# Patient Record
Sex: MALE | Race: WHITE | Employment: FULL TIME | URBAN - METROPOLITAN AREA
[De-identification: names, ages, dates, MRNs, and addresses within clinical notes are randomized per-mention and may not be internally consistent; named-entity substitution may affect disease eponyms.]

---

## 2017-01-04 RX ORDER — SULFASALAZINE 500 MG/1
TABLET ORAL
Qty: 120 TABLET | Refills: 0 | Status: SHIPPED | OUTPATIENT
Start: 2017-01-04 | End: 2017-03-17

## 2017-03-17 ENCOUNTER — OFFICE VISIT (OUTPATIENT)
Dept: RHEUMATOLOGY | Age: 67
End: 2017-03-17

## 2017-03-17 VITALS
TEMPERATURE: 98.6 F | SYSTOLIC BLOOD PRESSURE: 134 MMHG | HEIGHT: 66 IN | DIASTOLIC BLOOD PRESSURE: 70 MMHG | WEIGHT: 166 LBS | BODY MASS INDEX: 26.68 KG/M2

## 2017-03-17 DIAGNOSIS — Z79.899 HIGH RISK MEDICATION USE: ICD-10-CM

## 2017-03-17 DIAGNOSIS — L40.9 PSORIASIS: ICD-10-CM

## 2017-03-17 DIAGNOSIS — L40.50 PSORIATIC ARTHRITIS (HCC): Primary | ICD-10-CM

## 2017-03-17 PROCEDURE — 99214 OFFICE O/P EST MOD 30 MIN: CPT | Performed by: INTERNAL MEDICINE

## 2017-03-17 RX ORDER — SULFASALAZINE 500 MG/1
TABLET ORAL
Qty: 180 TABLET | Refills: 2 | Status: SHIPPED | OUTPATIENT
Start: 2017-03-17 | End: 2017-09-11

## 2017-03-30 RX ORDER — CLOBETASOL PROPIONATE 0.5 MG/G
CREAM TOPICAL
Qty: 45 G | Refills: 0 | Status: SHIPPED | OUTPATIENT
Start: 2017-03-30 | End: 2017-05-26 | Stop reason: SDUPTHER

## 2017-05-26 RX ORDER — CLOBETASOL PROPIONATE 0.5 MG/G
CREAM TOPICAL
Qty: 45 G | Refills: 0 | Status: SHIPPED | OUTPATIENT
Start: 2017-05-26 | End: 2017-07-17 | Stop reason: SDUPTHER

## 2017-06-23 ENCOUNTER — OFFICE VISIT (OUTPATIENT)
Dept: RHEUMATOLOGY | Age: 67
End: 2017-06-23

## 2017-06-23 VITALS
WEIGHT: 158 LBS | DIASTOLIC BLOOD PRESSURE: 82 MMHG | TEMPERATURE: 98 F | HEIGHT: 66 IN | BODY MASS INDEX: 25.39 KG/M2 | SYSTOLIC BLOOD PRESSURE: 120 MMHG

## 2017-06-23 DIAGNOSIS — L40.9 PSORIASIS: ICD-10-CM

## 2017-06-23 DIAGNOSIS — Z79.899 HIGH RISK MEDICATION USE: ICD-10-CM

## 2017-06-23 DIAGNOSIS — L40.50 PSORIATIC ARTHRITIS (HCC): Primary | ICD-10-CM

## 2017-06-23 PROCEDURE — 99214 OFFICE O/P EST MOD 30 MIN: CPT | Performed by: INTERNAL MEDICINE

## 2017-07-17 RX ORDER — CLOBETASOL PROPIONATE 0.5 MG/G
CREAM TOPICAL
Qty: 45 G | Refills: 0 | Status: SHIPPED | OUTPATIENT
Start: 2017-07-17 | End: 2017-11-21

## 2017-09-11 ENCOUNTER — OFFICE VISIT (OUTPATIENT)
Dept: DERMATOLOGY | Age: 67
End: 2017-09-11

## 2017-09-11 DIAGNOSIS — L40.0 PLAQUE PSORIASIS: Primary | ICD-10-CM

## 2017-09-11 DIAGNOSIS — L40.50 PSORIATIC ARTHRITIS (HCC): ICD-10-CM

## 2017-09-11 PROCEDURE — 99213 OFFICE O/P EST LOW 20 MIN: CPT | Performed by: DERMATOLOGY

## 2017-09-11 RX ORDER — FLUOCINONIDE 1 MG/G
CREAM TOPICAL
Qty: 120 G | Refills: 3 | Status: SHIPPED | OUTPATIENT
Start: 2017-09-11 | End: 2017-11-21

## 2017-11-21 ENCOUNTER — OFFICE VISIT (OUTPATIENT)
Dept: DERMATOLOGY | Age: 67
End: 2017-11-21

## 2017-11-21 ENCOUNTER — HOSPITAL ENCOUNTER (OUTPATIENT)
Dept: OTHER | Age: 67
Discharge: HOME | End: 2017-11-21
Attending: INTERNAL MEDICINE | Admitting: INTERNAL MEDICINE

## 2017-11-21 ENCOUNTER — OFFICE VISIT (OUTPATIENT)
Dept: RHEUMATOLOGY | Age: 67
End: 2017-11-21

## 2017-11-21 VITALS
HEIGHT: 67 IN | DIASTOLIC BLOOD PRESSURE: 82 MMHG | BODY MASS INDEX: 25.58 KG/M2 | HEART RATE: 80 BPM | TEMPERATURE: 98.6 F | SYSTOLIC BLOOD PRESSURE: 138 MMHG | WEIGHT: 163 LBS

## 2017-11-21 DIAGNOSIS — L40.50 PSORIATIC ARTHRITIS (HCC): Primary | ICD-10-CM

## 2017-11-21 DIAGNOSIS — Z79.899 HIGH RISK MEDICATION USE: ICD-10-CM

## 2017-11-21 DIAGNOSIS — L40.0 PLAQUE PSORIASIS: Primary | ICD-10-CM

## 2017-11-21 DIAGNOSIS — L40.9 PSORIASIS: ICD-10-CM

## 2017-11-21 PROCEDURE — 99213 OFFICE O/P EST LOW 20 MIN: CPT | Performed by: DERMATOLOGY

## 2017-11-21 PROCEDURE — 99214 OFFICE O/P EST MOD 30 MIN: CPT | Performed by: INTERNAL MEDICINE

## 2017-11-21 RX ORDER — CLOBETASOL PROPIONATE 0.5 MG/G
OINTMENT TOPICAL
Qty: 60 G | Refills: 2 | Status: SHIPPED | OUTPATIENT
Start: 2017-11-21 | End: 2018-01-08 | Stop reason: SDUPTHER

## 2017-11-21 NOTE — PROGRESS NOTES
[address] Caesar Araujo MD                                                 [address], [address]                                                (G) [phone] (F)      Primary provider: Joleen Telles DO  Patient identification: Sergio Burroughs,: 1950,Sex: male           ASSESSMENT:  Hernando Duarte was seen today for follow-up. Diagnoses and all orders for this visit:    Psoriatic arthritis ([address]    Psoriasis    High risk medication use  -     XR HAND RIGHT (MIN 3 VIEWS); Future  -     XR HAND LEFT (MIN 3 VIEWS); Future      Started Methotrexate 2 weeks ago. Subjective improvement in his joint symptoms, objectively-has active disease especially across MCP joints. Skin psoriasis-followed by Dr. Shanae Gomez reviewed-approximate 6% body surface area involvement-clobetasol ointment, awaiting clinical response on methotrexate. Previous medication-  Sulfasalazine-numbness and swelling around lips. Jolane Pontiff PLAN:    As above. Blood work in 4 weeks  Follow-up in 3 months. Patient indicates understanding and agrees with the management plan. I reviewed patients medical information and medical history in the medical records. Note is transcribed using voice recognition software. Inadvertent computerized transcription errors may be present. ##################################################################    Subjective:  Returns for follow up  for psoriasis and psoriatic arthritis. Methotrexate was prescribed in 2017, he just started a couple of weeks ago. Patient was waiting for hernia surgery, which is done in 2017, recovered well. Some subjective improvement in terms of stiffness across MCP joints, swelling is persistent. Finger joints, wrist are affected. Skin psoriasis is about the same. Other joints are asymptomatic.   He is tolerating

## 2017-11-21 NOTE — PROGRESS NOTES
examined and determined to be normal: Psych/Neuro, Scalp/hair, Neck and Breast/axilla/chest.    The following were examined and determined to be abnormal: Head/face, Neck, Abdomen, RUE, LUE, RLE, LLE, Nails/digits and Genitalia/groin/buttocks. Well appearing. 1.  Back, buttocks > elbows, legs, abdomen and right cheek - discrete and coalescing scaly erythematous papules and plaques. Assessment and Plan     1. Plaque psoriasis - about 6% BSA    Clobetasol ointment twice daily to affected areas until improved. Should see improvement with methotrexate over the coming weeks. Return in about 6 months (around 5/21/2018).

## 2017-12-26 RX ORDER — FOLIC ACID 1 MG/1
TABLET ORAL
Qty: 90 TABLET | Refills: 0 | Status: SHIPPED | OUTPATIENT
Start: 2017-12-26

## 2018-01-08 RX ORDER — CLOBETASOL PROPIONATE 0.5 MG/G
OINTMENT TOPICAL
Qty: 60 G | Refills: 2 | Status: SHIPPED | OUTPATIENT
Start: 2018-01-08 | End: 2018-04-04 | Stop reason: SDUPTHER

## 2018-01-11 ENCOUNTER — HOSPITAL ENCOUNTER (OUTPATIENT)
Dept: OTHER | Age: 68
Discharge: HOME | End: 2018-01-11
Attending: INTERNAL MEDICINE | Admitting: INTERNAL MEDICINE

## 2018-01-11 DIAGNOSIS — Z79.899 HIGH RISK MEDICATION USE: ICD-10-CM

## 2018-01-11 LAB
ALBUMIN SERPL-MCNC: 4.3 G/DL (ref 3.4–5)
ALP BLD-CCNC: 126 U/L (ref 40–129)
ALT SERPL-CCNC: 21 U/L (ref 10–40)
AST SERPL-CCNC: 23 U/L (ref 15–37)
BASOPHILS ABSOLUTE: 0 K/UL (ref 0–0.2)
BASOPHILS RELATIVE PERCENT: 0.6 %
BILIRUB SERPL-MCNC: 1.3 MG/DL (ref 0–1)
BILIRUBIN DIRECT: <0.2 MG/DL (ref 0–0.3)
BILIRUBIN, INDIRECT: ABNORMAL MG/DL (ref 0–1)
C-REACTIVE PROTEIN: 3.4 MG/L (ref 0–5.1)
CREAT SERPL-MCNC: 0.9 MG/DL (ref 0.8–1.3)
EOSINOPHILS ABSOLUTE: 0.3 K/UL (ref 0–0.6)
EOSINOPHILS RELATIVE PERCENT: 6.2 %
GFR AFRICAN AMERICAN: >60
GFR NON-AFRICAN AMERICAN: >60
HCT VFR BLD CALC: 43.8 % (ref 40.5–52.5)
HEMOGLOBIN: 14.7 G/DL (ref 13.5–17.5)
LYMPHOCYTES ABSOLUTE: 0.8 K/UL (ref 1–5.1)
LYMPHOCYTES RELATIVE PERCENT: 17.7 %
MCH RBC QN AUTO: 31.9 PG (ref 26–34)
MCHC RBC AUTO-ENTMCNC: 33.6 G/DL (ref 31–36)
MCV RBC AUTO: 95.1 FL (ref 80–100)
MONOCYTES ABSOLUTE: 0.4 K/UL (ref 0–1.3)
MONOCYTES RELATIVE PERCENT: 9 %
NEUTROPHILS ABSOLUTE: 3.1 K/UL (ref 1.7–7.7)
NEUTROPHILS RELATIVE PERCENT: 66.5 %
PDW BLD-RTO: 14.6 % (ref 12.4–15.4)
PLATELET # BLD: 167 K/UL (ref 135–450)
PMV BLD AUTO: 8.8 FL (ref 5–10.5)
RBC # BLD: 4.61 M/UL (ref 4.2–5.9)
SEDIMENTATION RATE, ERYTHROCYTE: 13 MM/HR (ref 0–20)
TOTAL PROTEIN: 7.2 G/DL (ref 6.4–8.2)
WBC # BLD: 4.6 K/UL (ref 4–11)

## 2018-04-04 RX ORDER — CLOBETASOL PROPIONATE 0.5 MG/G
OINTMENT TOPICAL
Qty: 60 G | Refills: 2 | Status: SHIPPED | OUTPATIENT
Start: 2018-04-04 | End: 2018-09-20 | Stop reason: SDUPTHER

## 2018-04-14 ENCOUNTER — HOSPITAL ENCOUNTER (OUTPATIENT)
Dept: OTHER | Age: 68
Discharge: HOME | End: 2018-04-14
Attending: INTERNAL MEDICINE | Admitting: INTERNAL MEDICINE

## 2018-04-14 LAB
A/G RATIO: 1.6 (ref 1.1–2.2)
ALBUMIN SERPL-MCNC: 4.2 G/DL (ref 3.4–5)
ALP BLD-CCNC: 152 U/L (ref 40–129)
ALT SERPL-CCNC: 18 U/L (ref 10–40)
ANION GAP SERPL CALCULATED.3IONS-SCNC: 17 MMOL/L (ref 3–16)
AST SERPL-CCNC: 20 U/L (ref 15–37)
BASOPHILS ABSOLUTE: 0.1 K/UL (ref 0–0.2)
BASOPHILS RELATIVE PERCENT: 1 %
BILIRUB SERPL-MCNC: 0.3 MG/DL (ref 0–1)
BILIRUBIN DIRECT: <0.2 MG/DL (ref 0–0.3)
BILIRUBIN, INDIRECT: NORMAL MG/DL (ref 0–1)
BUN BLDV-MCNC: 14 MG/DL (ref 7–20)
C-REACTIVE PROTEIN: 1.6 MG/L (ref 0–5.1)
CALCIUM SERPL-MCNC: 8.7 MG/DL (ref 8.3–10.6)
CHLORIDE BLD-SCNC: 102 MMOL/L (ref 99–110)
CO2: 23 MMOL/L (ref 21–32)
CREAT SERPL-MCNC: 0.7 MG/DL (ref 0.8–1.3)
EOSINOPHILS ABSOLUTE: 0.3 K/UL (ref 0–0.6)
EOSINOPHILS RELATIVE PERCENT: 6.5 %
GFR AFRICAN AMERICAN: >60
GFR NON-AFRICAN AMERICAN: >60
GLOBULIN: 2.7 G/DL
GLUCOSE BLD-MCNC: 90 MG/DL (ref 70–99)
HCT VFR BLD CALC: 41.5 % (ref 40.5–52.5)
HEMOGLOBIN: 14.2 G/DL (ref 13.5–17.5)
LYMPHOCYTES ABSOLUTE: 0.9 K/UL (ref 1–5.1)
LYMPHOCYTES RELATIVE PERCENT: 17.7 %
MCH RBC QN AUTO: 33.2 PG (ref 26–34)
MCHC RBC AUTO-ENTMCNC: 34.2 G/DL (ref 31–36)
MCV RBC AUTO: 97 FL (ref 80–100)
MONOCYTES ABSOLUTE: 0.5 K/UL (ref 0–1.3)
MONOCYTES RELATIVE PERCENT: 9.7 %
NEUTROPHILS ABSOLUTE: 3.3 K/UL (ref 1.7–7.7)
NEUTROPHILS RELATIVE PERCENT: 65.1 %
PDW BLD-RTO: 13.7 % (ref 12.4–15.4)
PLATELET # BLD: 163 K/UL (ref 135–450)
PMV BLD AUTO: 9.4 FL (ref 5–10.5)
POTASSIUM SERPL-SCNC: 4.2 MMOL/L (ref 3.5–5.1)
RBC # BLD: 4.28 M/UL (ref 4.2–5.9)
SEDIMENTATION RATE, ERYTHROCYTE: 12 MM/HR (ref 0–20)
SODIUM BLD-SCNC: 142 MMOL/L (ref 136–145)
TOTAL PROTEIN: 6.9 G/DL (ref 6.4–8.2)
WBC # BLD: 5.1 K/UL (ref 4–11)

## 2018-05-14 ENCOUNTER — OFFICE VISIT (OUTPATIENT)
Dept: DERMATOLOGY | Age: 68
End: 2018-05-14

## 2018-05-14 DIAGNOSIS — L40.0 PLAQUE PSORIASIS: Primary | ICD-10-CM

## 2018-05-14 PROCEDURE — 99213 OFFICE O/P EST LOW 20 MIN: CPT | Performed by: DERMATOLOGY

## 2018-06-17 ENCOUNTER — HOSPITAL ENCOUNTER (OUTPATIENT)
Dept: OTHER | Age: 68
Discharge: HOME | End: 2018-06-17
Attending: INTERNAL MEDICINE | Admitting: INTERNAL MEDICINE

## 2018-06-17 LAB
ALBUMIN SERPL-MCNC: 4.4 G/DL (ref 3.4–5)
ALP BLD-CCNC: 145 U/L (ref 40–129)
ALT SERPL-CCNC: 21 U/L (ref 10–40)
AST SERPL-CCNC: 25 U/L (ref 15–37)
BASOPHILS ABSOLUTE: 0 K/UL (ref 0–0.2)
BASOPHILS RELATIVE PERCENT: 0.9 %
BILIRUB SERPL-MCNC: 0.5 MG/DL (ref 0–1)
BILIRUBIN DIRECT: <0.2 MG/DL (ref 0–0.3)
BILIRUBIN, INDIRECT: ABNORMAL MG/DL (ref 0–1)
EOSINOPHILS ABSOLUTE: 0.3 K/UL (ref 0–0.6)
EOSINOPHILS RELATIVE PERCENT: 5.9 %
HCT VFR BLD CALC: 42.2 % (ref 40.5–52.5)
HEMOGLOBIN: 14.6 G/DL (ref 13.5–17.5)
LYMPHOCYTES ABSOLUTE: 0.7 K/UL (ref 1–5.1)
LYMPHOCYTES RELATIVE PERCENT: 14.6 %
MCH RBC QN AUTO: 34.3 PG (ref 26–34)
MCHC RBC AUTO-ENTMCNC: 34.6 G/DL (ref 31–36)
MCV RBC AUTO: 99.1 FL (ref 80–100)
MONOCYTES ABSOLUTE: 0.5 K/UL (ref 0–1.3)
MONOCYTES RELATIVE PERCENT: 9.8 %
NEUTROPHILS ABSOLUTE: 3.2 K/UL (ref 1.7–7.7)
NEUTROPHILS RELATIVE PERCENT: 68.8 %
PDW BLD-RTO: 14.2 % (ref 12.4–15.4)
PLATELET # BLD: 158 K/UL (ref 135–450)
PMV BLD AUTO: 9.8 FL (ref 5–10.5)
RBC # BLD: 4.26 M/UL (ref 4.2–5.9)
SEDIMENTATION RATE, ERYTHROCYTE: 8 MM/HR (ref 0–20)
TOTAL PROTEIN: 6.9 G/DL (ref 6.4–8.2)
WBC # BLD: 4.6 K/UL (ref 4–11)

## 2018-06-21 ENCOUNTER — OFFICE VISIT (OUTPATIENT)
Dept: RHEUMATOLOGY | Age: 68
End: 2018-06-21

## 2018-06-21 VITALS
WEIGHT: 161 LBS | TEMPERATURE: 98.2 F | SYSTOLIC BLOOD PRESSURE: 124 MMHG | BODY MASS INDEX: 25.27 KG/M2 | HEIGHT: 67 IN | HEART RATE: 70 BPM | DIASTOLIC BLOOD PRESSURE: 72 MMHG

## 2018-06-21 DIAGNOSIS — L40.50 PSORIATIC ARTHRITIS (HCC): Primary | ICD-10-CM

## 2018-06-21 DIAGNOSIS — L40.9 PSORIASIS: ICD-10-CM

## 2018-06-21 DIAGNOSIS — Z79.899 HIGH RISK MEDICATION USE: ICD-10-CM

## 2018-06-21 PROCEDURE — 99214 OFFICE O/P EST MOD 30 MIN: CPT | Performed by: INTERNAL MEDICINE

## 2018-06-25 ENCOUNTER — PATIENT MESSAGE (OUTPATIENT)
Dept: RHEUMATOLOGY | Age: 68
End: 2018-06-25

## 2018-06-25 DIAGNOSIS — M19.90 INFLAMMATORY ARTHRITIS: Primary | ICD-10-CM

## 2018-08-22 ENCOUNTER — HOSPITAL ENCOUNTER (OUTPATIENT)
Age: 68
Discharge: HOME | End: 2018-08-22
Payer: COMMERCIAL

## 2018-08-22 DIAGNOSIS — Z79.899 HIGH RISK MEDICATION USE: ICD-10-CM

## 2018-08-22 LAB
A/G RATIO: 1.6 (ref 1.1–2.2)
ALBUMIN SERPL-MCNC: 4.4 G/DL (ref 3.4–5)
ALP BLD-CCNC: 133 U/L (ref 40–129)
ALT SERPL-CCNC: 23 U/L (ref 10–40)
ANION GAP SERPL CALCULATED.3IONS-SCNC: 13 MMOL/L (ref 3–16)
AST SERPL-CCNC: 23 U/L (ref 15–37)
BASOPHILS ABSOLUTE: 0.1 K/UL (ref 0–0.2)
BASOPHILS RELATIVE PERCENT: 1.3 %
BILIRUB SERPL-MCNC: 0.7 MG/DL (ref 0–1)
BUN BLDV-MCNC: 11 MG/DL (ref 7–20)
CALCIUM SERPL-MCNC: 9 MG/DL (ref 8.3–10.6)
CHLORIDE BLD-SCNC: 102 MMOL/L (ref 99–110)
CO2: 26 MMOL/L (ref 21–32)
CREAT SERPL-MCNC: 0.7 MG/DL (ref 0.8–1.3)
EOSINOPHILS ABSOLUTE: 0.3 K/UL (ref 0–0.6)
EOSINOPHILS RELATIVE PERCENT: 6.6 %
GFR AFRICAN AMERICAN: >60
GFR NON-AFRICAN AMERICAN: >60
GLOBULIN: 2.7 G/DL
GLUCOSE BLD-MCNC: 94 MG/DL (ref 70–99)
HCT VFR BLD CALC: 44.1 % (ref 40.5–52.5)
HEMOGLOBIN: 15 G/DL (ref 13.5–17.5)
LYMPHOCYTES ABSOLUTE: 0.7 K/UL (ref 1–5.1)
LYMPHOCYTES RELATIVE PERCENT: 16.3 %
MCH RBC QN AUTO: 34 PG (ref 26–34)
MCHC RBC AUTO-ENTMCNC: 34 G/DL (ref 31–36)
MCV RBC AUTO: 99.8 FL (ref 80–100)
MONOCYTES ABSOLUTE: 0.4 K/UL (ref 0–1.3)
MONOCYTES RELATIVE PERCENT: 9.8 %
NEUTROPHILS ABSOLUTE: 2.7 K/UL (ref 1.7–7.7)
NEUTROPHILS RELATIVE PERCENT: 66 %
PDW BLD-RTO: 13.6 % (ref 12.4–15.4)
PLATELET # BLD: 165 K/UL (ref 135–450)
PMV BLD AUTO: 9.3 FL (ref 5–10.5)
POTASSIUM SERPL-SCNC: 4.6 MMOL/L (ref 3.5–5.1)
RBC # BLD: 4.42 M/UL (ref 4.2–5.9)
SODIUM BLD-SCNC: 141 MMOL/L (ref 136–145)
TOTAL PROTEIN: 7.1 G/DL (ref 6.4–8.2)
WBC # BLD: 4.1 K/UL (ref 4–11)

## 2018-08-22 PROCEDURE — 85025 COMPLETE CBC W/AUTO DIFF WBC: CPT

## 2018-08-22 PROCEDURE — 36415 COLL VENOUS BLD VENIPUNCTURE: CPT

## 2018-08-22 PROCEDURE — 80053 COMPREHEN METABOLIC PANEL: CPT

## 2018-08-27 ENCOUNTER — PATIENT MESSAGE (OUTPATIENT)
Dept: RHEUMATOLOGY | Age: 68
End: 2018-08-27

## 2018-08-27 DIAGNOSIS — M19.90 INFLAMMATORY ARTHRITIS: ICD-10-CM

## 2018-09-18 ENCOUNTER — OFFICE VISIT (OUTPATIENT)
Dept: RHEUMATOLOGY | Age: 68
End: 2018-09-18

## 2018-09-18 VITALS
DIASTOLIC BLOOD PRESSURE: 78 MMHG | WEIGHT: 164 LBS | HEART RATE: 74 BPM | TEMPERATURE: 98.2 F | BODY MASS INDEX: 25.74 KG/M2 | SYSTOLIC BLOOD PRESSURE: 122 MMHG | HEIGHT: 67 IN

## 2018-09-18 DIAGNOSIS — L40.9 PSORIASIS: ICD-10-CM

## 2018-09-18 DIAGNOSIS — L40.50 PSORIATIC ARTHRITIS (HCC): Primary | ICD-10-CM

## 2018-09-18 DIAGNOSIS — Z79.899 HIGH RISK MEDICATION USE: ICD-10-CM

## 2018-09-18 PROCEDURE — 99214 OFFICE O/P EST MOD 30 MIN: CPT | Performed by: INTERNAL MEDICINE

## 2018-09-18 NOTE — PROGRESS NOTES
systems are negative. Past medical, surgical, family history- reviewed. Current Outpatient Prescriptions   Medication Sig Dispense Refill    methotrexate (RHEUMATREX) 2.5 MG chemo tablet Take 8 tab po once a week 32 tablet 1    clobetasol (TEMOVATE) 0.05 % ointment Apply to affected areas twice daily for up to 2 weeks or until improved. 60 g 2    folic acid (FOLVITE) 1 MG tablet TAKE 1 TABLET BY MOUTH DAILY 90 tablet 0    doxazosin (CARDURA) 4 MG tablet TAKE 1 TABLET BY MOUTH DAILY 90 tablet 1    Multiple Vitamins-Minerals (MULTIVITAMIN PO) Take  by mouth.  aspirin EC 81 MG EC tablet Take 81 mg by mouth daily.  Cholecalciferol (VITAMIN D3) 2000 UNITS CAPS Take  by mouth. No current facility-administered medications for this visit. Allergies   Allergen Reactions    Sulfasalazine Swelling     Swelling and numbness around lips.  Lisinopril Swelling     No arbs also           OBJECTIVE  Physical    Vitals:    09/18/18 0739   BP: 122/78   Pulse: 74   Temp: 98.2 °F (36.8 °C)       General appearance: alert, appears stated age and cooperative. MKS:   28 joint JOINT COUNT:                               Right                   Left   Swell Tender Swell Tender   PIP1           PIP2           PIP3           PIP4          PIP5          MCP1           MCP2 Bony swelling       MCP3       MCP4        MCP5           Wrist           Elbow           Shoulder          Knee             Bony swelling of MCPS R hand 2,3, non tender, subjective stiffness. FROM in all peripheral joints, including fists. Normal gait and muscle strength in upper and lower extremities. Skin: no Psoriasis. No induration. No nail changes. HEENT: Normal lids and pupils. No oral or nasal ulcers. Salivary glands reveals no evidence of abnormality.        Data:  Lab Results   Component Value Date    WBC 4.1 08/22/2018    RBC 4.42 08/22/2018    HGB 15.0 08/22/2018    HCT 44.1 08/22/2018     08/22/2018    MCV 99.8 08/22/2018    MCH 34.0 08/22/2018    MCHC 34.0 08/22/2018    RDW 13.6 08/22/2018    SEGSPCT 67 06/21/2014    LYMPHOPCT 16.3 08/22/2018    MONOPCT 9.8 08/22/2018    BASOPCT 1.3 08/22/2018    MONOSABS 0.4 08/22/2018    LYMPHSABS 0.7 08/22/2018    EOSABS 0.3 08/22/2018    BASOSABS 0.1 08/22/2018       Chemistry        Component Value Date/Time     08/22/2018 0715    K 4.6 08/22/2018 0715     08/22/2018 0715    CO2 26 08/22/2018 0715    BUN 11 08/22/2018 0715    CREATININE 0.7 (L) 08/22/2018 0715        Component Value Date/Time    CALCIUM 9.0 08/22/2018 0715    ALKPHOS 133 (H) 08/22/2018 0715    AST 23 08/22/2018 0715    ALT 23 08/22/2018 0715    BILITOT 0.7 08/22/2018 0715          Lab Results   Component Value Date    SEDRATE 8 06/17/2018              I thank you for giving me the opportunity to be involved in [employer] care and I look forward following Susy Adler along with you. If you have any questions or concerns please feel free to contact me at any time.     Dasia Rod MD 09/18/18

## 2018-09-20 RX ORDER — CLOBETASOL PROPIONATE 0.5 MG/G
OINTMENT TOPICAL
Qty: 60 G | Refills: 2 | Status: SHIPPED | OUTPATIENT
Start: 2018-09-20 | End: 2018-12-26 | Stop reason: SDUPTHER

## 2018-09-20 NOTE — TELEPHONE ENCOUNTER
From: Romi Jensen  Sent: 9/20/2018 2:55 PM EDT  Subject: Medication Renewal Request    Romi Jensen would like a refill of the following medications:     clobetasol (TEMOVATE) 0.05 % ointment Socorro Nelson MD]    Preferred pharmacy: [facility] [address] - P [phone] - F [phone]

## 2018-10-26 DIAGNOSIS — M19.90 INFLAMMATORY ARTHRITIS: ICD-10-CM

## 2018-10-27 ENCOUNTER — HOSPITAL ENCOUNTER (OUTPATIENT)
Age: 68
Discharge: HOME | End: 2018-10-27
Payer: COMMERCIAL

## 2018-10-27 DIAGNOSIS — Z79.899 HIGH RISK MEDICATION USE: ICD-10-CM

## 2018-10-27 LAB
ALT SERPL-CCNC: 28 U/L (ref 10–40)
AST SERPL-CCNC: 27 U/L (ref 15–37)
BASOPHILS ABSOLUTE: 0 K/UL (ref 0–0.2)
BASOPHILS RELATIVE PERCENT: 0.7 %
CREAT SERPL-MCNC: 0.8 MG/DL (ref 0.8–1.3)
EOSINOPHILS ABSOLUTE: 0.3 K/UL (ref 0–0.6)
EOSINOPHILS RELATIVE PERCENT: 6.2 %
GFR AFRICAN AMERICAN: >60
GFR NON-AFRICAN AMERICAN: >60
HCT VFR BLD CALC: 43.5 % (ref 40.5–52.5)
HEMOGLOBIN: 14.6 G/DL (ref 13.5–17.5)
LYMPHOCYTES ABSOLUTE: 0.8 K/UL (ref 1–5.1)
LYMPHOCYTES RELATIVE PERCENT: 16.5 %
MCH RBC QN AUTO: 33.2 PG (ref 26–34)
MCHC RBC AUTO-ENTMCNC: 33.6 G/DL (ref 31–36)
MCV RBC AUTO: 99 FL (ref 80–100)
MONOCYTES ABSOLUTE: 0.5 K/UL (ref 0–1.3)
MONOCYTES RELATIVE PERCENT: 10.2 %
NEUTROPHILS ABSOLUTE: 3.2 K/UL (ref 1.7–7.7)
NEUTROPHILS RELATIVE PERCENT: 66.4 %
PDW BLD-RTO: 13.5 % (ref 12.4–15.4)
PLATELET # BLD: 168 K/UL (ref 135–450)
PMV BLD AUTO: 9.3 FL (ref 5–10.5)
RBC # BLD: 4.39 M/UL (ref 4.2–5.9)
WBC # BLD: 4.8 K/UL (ref 4–11)

## 2018-10-27 PROCEDURE — 84450 TRANSFERASE (AST) (SGOT): CPT

## 2018-10-27 PROCEDURE — 85025 COMPLETE CBC W/AUTO DIFF WBC: CPT

## 2018-10-27 PROCEDURE — 36415 COLL VENOUS BLD VENIPUNCTURE: CPT

## 2018-10-27 PROCEDURE — 84460 ALANINE AMINO (ALT) (SGPT): CPT

## 2018-10-27 PROCEDURE — 82565 ASSAY OF CREATININE: CPT

## 2018-11-24 DIAGNOSIS — M19.90 INFLAMMATORY ARTHRITIS: ICD-10-CM

## 2018-12-18 ENCOUNTER — OFFICE VISIT (OUTPATIENT)
Dept: RHEUMATOLOGY | Age: 68
End: 2018-12-18
Payer: COMMERCIAL

## 2018-12-18 VITALS
HEIGHT: 67 IN | HEART RATE: 74 BPM | DIASTOLIC BLOOD PRESSURE: 76 MMHG | TEMPERATURE: 98.3 F | SYSTOLIC BLOOD PRESSURE: 124 MMHG | BODY MASS INDEX: 25.9 KG/M2 | WEIGHT: 165 LBS

## 2018-12-18 DIAGNOSIS — M15.9 PRIMARY OSTEOARTHRITIS INVOLVING MULTIPLE JOINTS: ICD-10-CM

## 2018-12-18 DIAGNOSIS — Z79.899 HIGH RISK MEDICATION USE: ICD-10-CM

## 2018-12-18 DIAGNOSIS — L40.9 PSORIASIS: ICD-10-CM

## 2018-12-18 DIAGNOSIS — L40.50 PSORIATIC ARTHRITIS (HCC): Primary | ICD-10-CM

## 2018-12-18 PROCEDURE — 99214 OFFICE O/P EST MOD 30 MIN: CPT | Performed by: INTERNAL MEDICINE

## 2018-12-18 NOTE — PROGRESS NOTES
[address] Caesar Araujo MD                                                 [address], [address]                                               [phone] (V) [phone] (F)      Primary provider: Sam Ho DO  Patient identification: Dann Burroughs,: 1950,Sex: male           ASSESSMENT:  Lila Swan was seen today for follow-up. Diagnoses and all orders for this visit:    Psoriatic arthritis ([facility] [address]    Psoriasis    High risk medication use    Primary osteoarthritis involving multiple joints         Psoriasis and psoriatic arthritis- in remission on Methotrexate 20 mg weekly. OA hands- getting worse with weather change, is able to manage without any meds. Labs stable, [facility]. Skin psoriasis-followed by Dr. José Kelly reviewed-approximate 6% body surface area involvement- now cleared. Completed all vaccinations. Previous medication-  Sulfasalazine-numbness and swelling around lips. Jolaine Dent PLAN:  Continue above medications. Hand exercises discussed for osteoarthritis. Labs in early March, follow-up in 3 months. Patient indicates understanding and agrees with the management plan. I reviewed patients medical information and medical history in the medical records. Note is transcribed using voice recognition software. Inadvertent computerized transcription errors may be present. ##################################################################    Subjective:  Returns for follow up  for psoriasis, psoriatic arthritis and osteoarthritis. He is doing well in terms of psoriasis and psoriatic arthritis, in remission. No skin lesions, painful or swollen joints. He is noticing more achiness and stiffness mainly across DIPs, PIPs, CMC, able to manage with stressing. Tolerating medications well.   ADLs and recreational activities are normal. Denies any cough, shortness of breath, GI upset, mucositis, rashes. All other review of systems are negative. Past medical, surgical, family history- reviewed. Current Outpatient Prescriptions   Medication Sig Dispense Refill    methotrexate (RHEUMATREX) 2.5 MG chemo tablet TAKE 8 TABLETS BY MOUTH ONCE PER WEEK AS DIRECTED 32 tablet 0    clobetasol (TEMOVATE) 0.05 % ointment Apply to affected areas twice daily for up to 2 weeks or until improved. 60 g 2    folic acid (FOLVITE) 1 MG tablet TAKE 1 TABLET BY MOUTH DAILY 90 tablet 0    doxazosin (CARDURA) 4 MG tablet TAKE 1 TABLET BY MOUTH DAILY 90 tablet 1    Multiple Vitamins-Minerals (MULTIVITAMIN PO) Take  by mouth.  aspirin EC 81 MG EC tablet Take 81 mg by mouth daily.  Cholecalciferol (VITAMIN D3) 2000 UNITS CAPS Take  by mouth. No current facility-administered medications for this visit. Allergies   Allergen Reactions    Sulfasalazine Swelling     Swelling and numbness around lips.  Lisinopril Swelling     No arbs also           OBJECTIVE  Physical    Vitals:    12/18/18 0745   BP: 124/76   Pulse: 74   Temp: 98.3 °F (36.8 °C)       General appearance: alert, appears stated age and cooperative. MKS:   28 joint JOINT COUNT:                               Right                   Left   Swell Tender Swell Tender   PIP1           PIP2           PIP3           PIP4          PIP5          MCP1           MCP2        MCP3       MCP4        MCP5           Wrist           Elbow           Shoulder          Knee             I do not appreciate any tender, swollen or inflamed joints in upper or lower extremities. Bony swelling across MCPs, PIPs, DIPs is unchanged from prior visits. FROM in all peripheral joints, including fists. Not able to extend hand fully especially right hand fingers. Normal gait and muscle strength in upper and lower extremities. Skin: no Psoriasis. No induration. No nail changes.   HEENT: Normal lids and pupils. No oral or nasal ulcers. Salivary glands reveals no evidence of abnormality. Data:  Lab Results   Component Value Date    WBC 4.8 10/27/2018    RBC 4.39 10/27/2018    HGB 14.6 10/27/2018    HCT 43.5 10/27/2018     10/27/2018    MCV 99.0 10/27/2018    MCH 33.2 10/27/2018    MCHC 33.6 10/27/2018    RDW 13.5 10/27/2018    SEGSPCT 67 06/21/2014    LYMPHOPCT 16.5 10/27/2018    MONOPCT 10.2 10/27/2018    BASOPCT 0.7 10/27/2018    MONOSABS 0.5 10/27/2018    LYMPHSABS 0.8 10/27/2018    EOSABS 0.3 10/27/2018    BASOSABS 0.0 10/27/2018       Chemistry        Component Value Date/Time     08/22/2018 0715    K 4.6 08/22/2018 0715     08/22/2018 0715    CO2 26 08/22/2018 0715    BUN 11 08/22/2018 0715    CREATININE 0.8 10/27/2018 1030        Component Value Date/Time    CALCIUM 9.0 08/22/2018 0715    ALKPHOS 133 (H) 08/22/2018 0715    AST 27 10/27/2018 1030    ALT 28 10/27/2018 1030    BILITOT 0.7 08/22/2018 0715          Lab Results   Component Value Date    SEDRATE 8 06/17/2018              I thank you for giving me the opportunity to be involved in [employer] care and I look forward following Shanda More along with you. If you have any questions or concerns please feel free to contact me at any time.     Roberto Borrego MD 12/18/18

## 2018-12-26 RX ORDER — CLOBETASOL PROPIONATE 0.5 MG/G
OINTMENT TOPICAL
Qty: 60 G | Refills: 2 | Status: SHIPPED | OUTPATIENT
Start: 2018-12-26 | End: 2019-03-25 | Stop reason: SDUPTHER

## 2019-01-02 DIAGNOSIS — M19.90 INFLAMMATORY ARTHRITIS: ICD-10-CM

## 2019-01-03 DIAGNOSIS — M19.90 INFLAMMATORY ARTHRITIS: ICD-10-CM

## 2019-02-23 ENCOUNTER — HOSPITAL ENCOUNTER (OUTPATIENT)
Age: 69
Discharge: HOME | End: 2019-02-23
Payer: COMMERCIAL

## 2019-02-23 DIAGNOSIS — Z79.899 HIGH RISK MEDICATION USE: ICD-10-CM

## 2019-02-23 LAB
ALT SERPL-CCNC: 19 U/L (ref 10–40)
AST SERPL-CCNC: 21 U/L (ref 15–37)
BASOPHILS ABSOLUTE: 0 K/UL (ref 0–0.2)
BASOPHILS RELATIVE PERCENT: 0.6 %
CREAT SERPL-MCNC: 0.8 MG/DL (ref 0.8–1.3)
EOSINOPHILS ABSOLUTE: 0.3 K/UL (ref 0–0.6)
EOSINOPHILS RELATIVE PERCENT: 6.5 %
GFR AFRICAN AMERICAN: >60
GFR NON-AFRICAN AMERICAN: >60
HCT VFR BLD CALC: 43.7 % (ref 40.5–52.5)
HEMOGLOBIN: 14.6 G/DL (ref 13.5–17.5)
LYMPHOCYTES ABSOLUTE: 0.7 K/UL (ref 1–5.1)
LYMPHOCYTES RELATIVE PERCENT: 15 %
MCH RBC QN AUTO: 33.3 PG (ref 26–34)
MCHC RBC AUTO-ENTMCNC: 33.4 G/DL (ref 31–36)
MCV RBC AUTO: 99.5 FL (ref 80–100)
MONOCYTES ABSOLUTE: 0.6 K/UL (ref 0–1.3)
MONOCYTES RELATIVE PERCENT: 12.3 %
NEUTROPHILS ABSOLUTE: 3.2 K/UL (ref 1.7–7.7)
NEUTROPHILS RELATIVE PERCENT: 65.6 %
PDW BLD-RTO: 13.4 % (ref 12.4–15.4)
PLATELET # BLD: 180 K/UL (ref 135–450)
PMV BLD AUTO: 9.6 FL (ref 5–10.5)
RBC # BLD: 4.39 M/UL (ref 4.2–5.9)
WBC # BLD: 4.8 K/UL (ref 4–11)

## 2019-02-23 PROCEDURE — 36415 COLL VENOUS BLD VENIPUNCTURE: CPT

## 2019-02-23 PROCEDURE — 84460 ALANINE AMINO (ALT) (SGPT): CPT

## 2019-02-23 PROCEDURE — 84450 TRANSFERASE (AST) (SGOT): CPT

## 2019-02-23 PROCEDURE — 85025 COMPLETE CBC W/AUTO DIFF WBC: CPT

## 2019-02-23 PROCEDURE — 82565 ASSAY OF CREATININE: CPT

## 2019-02-26 DIAGNOSIS — M19.90 INFLAMMATORY ARTHRITIS: ICD-10-CM

## 2019-03-21 ENCOUNTER — OFFICE VISIT (OUTPATIENT)
Dept: RHEUMATOLOGY | Age: 69
End: 2019-03-21
Payer: COMMERCIAL

## 2019-03-21 VITALS
BODY MASS INDEX: 24.33 KG/M2 | TEMPERATURE: 97.8 F | HEIGHT: 67 IN | WEIGHT: 155 LBS | DIASTOLIC BLOOD PRESSURE: 82 MMHG | HEART RATE: 80 BPM | SYSTOLIC BLOOD PRESSURE: 124 MMHG

## 2019-03-21 DIAGNOSIS — Z79.899 HIGH RISK MEDICATION USE: ICD-10-CM

## 2019-03-21 DIAGNOSIS — L40.50 PSORIATIC ARTHRITIS (HCC): Primary | ICD-10-CM

## 2019-03-21 DIAGNOSIS — L40.9 PSORIASIS: ICD-10-CM

## 2019-03-21 PROCEDURE — 99214 OFFICE O/P EST MOD 30 MIN: CPT | Performed by: INTERNAL MEDICINE

## 2019-03-25 ENCOUNTER — PATIENT MESSAGE (OUTPATIENT)
Dept: DERMATOLOGY | Age: 69
End: 2019-03-25

## 2019-03-25 RX ORDER — CLOBETASOL PROPIONATE 0.5 MG/G
OINTMENT TOPICAL
Qty: 60 G | Refills: 2 | Status: SHIPPED | OUTPATIENT
Start: 2019-03-25 | End: 2019-07-29 | Stop reason: SDUPTHER

## 2019-03-26 ENCOUNTER — PATIENT MESSAGE (OUTPATIENT)
Dept: DERMATOLOGY | Age: 69
End: 2019-03-26

## 2019-05-20 ENCOUNTER — PATIENT MESSAGE (OUTPATIENT)
Dept: DERMATOLOGY | Age: 69
End: 2019-05-20

## 2019-05-20 ENCOUNTER — OFFICE VISIT (OUTPATIENT)
Dept: DERMATOLOGY | Age: 69
End: 2019-05-20
Payer: COMMERCIAL

## 2019-05-20 DIAGNOSIS — L40.0 PLAQUE PSORIASIS: Primary | ICD-10-CM

## 2019-05-20 DIAGNOSIS — L57.0 ACTINIC KERATOSIS: ICD-10-CM

## 2019-05-20 PROCEDURE — 99213 OFFICE O/P EST LOW 20 MIN: CPT | Performed by: DERMATOLOGY

## 2019-05-20 RX ORDER — CALCIPOTRIENE 50 UG/G
CREAM TOPICAL
Qty: 60 G | Refills: 4 | Status: SHIPPED | OUTPATIENT
Start: 2019-05-20 | End: 2020-02-27 | Stop reason: SDUPTHER

## 2019-05-20 RX ORDER — CALCIPOTRIENE 50 UG/G
CREAM TOPICAL
Qty: 60 G | Refills: 4 | Status: SHIPPED | OUTPATIENT
Start: 2019-05-20 | End: 2019-05-20 | Stop reason: SDUPTHER

## 2019-05-20 NOTE — PROGRESS NOTES
[facility]  Sakina Rudd MD  [phone]      Jia Burroughs  1950    71 y.o. male     Date of Visit: 5/20/2019    Chief Complaint: psoraisis    History of Present Illness:    1. He returns today to follow-up for moderately extensive plaque psoriasis in the setting of psoriatic arthritis. Overall doing great on methotrexate and with use of clobetasol cream. C/o some lesions on the back and legs. On methotrexate 20 mg weekly for psoriatic arthritis. Review of Systems:  Skin: no other lesions or growths. Past Medical History, Family History, Surgical History, Medications and Allergies reviewed. Past Medical History:   Diagnosis Date    History of SCC (squamous cell carcinoma) of skin 10/3/14    RT cheek    Hypertension      Past Surgical History:   Procedure Laterality Date    APPENDECTOMY  1996    COLONOSCOPY  2014    next 2017   [address] Left 09/2017    OTHER SURGICAL HISTORY Right     Squamous cell carcinoma of R cheek       Allergies   Allergen Reactions    Sulfasalazine Swelling     Swelling and numbness around lips.  Lisinopril Swelling     No arbs also       Outpatient Medications Marked as Taking for the 5/20/19 encounter (Office Visit) with Huseyin Gunter MD   Medication Sig Dispense Refill    calcipotriene (DOVONEX) 0.005 % cream Apply to affected areas on the back twice daily. 60 g 4    clobetasol (TEMOVATE) 0.05 % ointment Apply to affected areas twice daily for up to 2 weeks or until improved. 60 g 2    methotrexate (RHEUMATREX) 2.5 MG chemo tablet Take 8 tablets by mouth once a week 32 tablet 0    folic acid (FOLVITE) 1 MG tablet TAKE 1 TABLET BY MOUTH DAILY 90 tablet 0    doxazosin (CARDURA) 4 MG tablet TAKE 1 TABLET BY MOUTH DAILY 90 tablet 1    Multiple Vitamins-Minerals (MULTIVITAMIN PO) Take  by mouth.  aspirin EC 81 MG EC tablet Take 81 mg by mouth daily.       Cholecalciferol (VITAMIN D3) 2000

## 2019-05-20 NOTE — TELEPHONE ENCOUNTER
From: Shasta Varela  To: Moshe Beard MD  Sent: 5/20/2019 1:46 PM EDT  Subject: Prescription Question    Please change the prescription from [facility] to [facility] (corner of [employer] [address], [place], [address]). It will save me about $86 with Good RX coupon. Thank you!     Ni Dumont \"[facility] [facility]

## 2019-06-18 ENCOUNTER — HOSPITAL ENCOUNTER (OUTPATIENT)
Age: 69
Discharge: HOME | End: 2019-06-18
Payer: COMMERCIAL

## 2019-06-18 DIAGNOSIS — M19.90 INFLAMMATORY ARTHRITIS: ICD-10-CM

## 2019-06-18 DIAGNOSIS — Z79.899 HIGH RISK MEDICATION USE: ICD-10-CM

## 2019-06-18 LAB
ALT SERPL-CCNC: 23 U/L (ref 10–40)
AST SERPL-CCNC: 24 U/L (ref 15–37)
BASOPHILS ABSOLUTE: 0 K/UL (ref 0–0.2)
BASOPHILS RELATIVE PERCENT: 0.6 %
C-REACTIVE PROTEIN: 6.4 MG/L (ref 0–5.1)
CREAT SERPL-MCNC: 0.8 MG/DL (ref 0.8–1.3)
EOSINOPHILS ABSOLUTE: 0.2 K/UL (ref 0–0.6)
EOSINOPHILS RELATIVE PERCENT: 4.7 %
GFR AFRICAN AMERICAN: >60
GFR NON-AFRICAN AMERICAN: >60
HCT VFR BLD CALC: 43.5 % (ref 40.5–52.5)
HEMOGLOBIN: 14.7 G/DL (ref 13.5–17.5)
LYMPHOCYTES ABSOLUTE: 0.6 K/UL (ref 1–5.1)
LYMPHOCYTES RELATIVE PERCENT: 13.5 %
MCH RBC QN AUTO: 33.6 PG (ref 26–34)
MCHC RBC AUTO-ENTMCNC: 33.8 G/DL (ref 31–36)
MCV RBC AUTO: 99.4 FL (ref 80–100)
MONOCYTES ABSOLUTE: 0.5 K/UL (ref 0–1.3)
MONOCYTES RELATIVE PERCENT: 11 %
NEUTROPHILS ABSOLUTE: 3.3 K/UL (ref 1.7–7.7)
NEUTROPHILS RELATIVE PERCENT: 70.2 %
PDW BLD-RTO: 13.6 % (ref 12.4–15.4)
PLATELET # BLD: 158 K/UL (ref 135–450)
PMV BLD AUTO: 9 FL (ref 5–10.5)
RBC # BLD: 4.37 M/UL (ref 4.2–5.9)
WBC # BLD: 4.7 K/UL (ref 4–11)

## 2019-06-18 PROCEDURE — 82565 ASSAY OF CREATININE: CPT

## 2019-06-18 PROCEDURE — 36415 COLL VENOUS BLD VENIPUNCTURE: CPT

## 2019-06-18 PROCEDURE — 86140 C-REACTIVE PROTEIN: CPT

## 2019-06-18 PROCEDURE — 84460 ALANINE AMINO (ALT) (SGPT): CPT

## 2019-06-18 PROCEDURE — 84450 TRANSFERASE (AST) (SGOT): CPT

## 2019-06-18 PROCEDURE — 85025 COMPLETE CBC W/AUTO DIFF WBC: CPT

## 2019-06-24 ENCOUNTER — OFFICE VISIT (OUTPATIENT)
Dept: RHEUMATOLOGY | Age: 69
End: 2019-06-24
Payer: COMMERCIAL

## 2019-06-24 VITALS
HEIGHT: 67 IN | BODY MASS INDEX: 25.43 KG/M2 | WEIGHT: 162 LBS | DIASTOLIC BLOOD PRESSURE: 74 MMHG | SYSTOLIC BLOOD PRESSURE: 132 MMHG

## 2019-06-24 DIAGNOSIS — Z79.899 HIGH RISK MEDICATION USE: ICD-10-CM

## 2019-06-24 DIAGNOSIS — L40.9 PSORIASIS: ICD-10-CM

## 2019-06-24 DIAGNOSIS — M15.9 PRIMARY OSTEOARTHRITIS INVOLVING MULTIPLE JOINTS: ICD-10-CM

## 2019-06-24 DIAGNOSIS — L40.50 PSORIATIC ARTHRITIS (HCC): Primary | ICD-10-CM

## 2019-06-24 PROCEDURE — 99214 OFFICE O/P EST MOD 30 MIN: CPT | Performed by: INTERNAL MEDICINE

## 2019-06-24 NOTE — PROGRESS NOTES
[address] Caesar Araujo MD                                                 [address], [address] [facility]                                                (M) [phone] (F)      Primary provider: Flaco Santos DO  Patient identification: Timbo Burroughs,: 1950,Sex: male       ASSESSMENT:  Miguel Angel Ruffin was seen today for follow-up. Diagnoses and all orders for this visit:    Psoriatic arthritis ([facility] [address]    High risk medication use    Psoriasis    Primary osteoarthritis involving multiple joints    Other orders  -     methotrexate (RHEUMATREX) 2.5 MG chemo tablet; Take 8 tab po once a week         Psoriasis and psoriatic arthritis-asymptomatic on methotrexate 20 mg weekly. Tolerating medications well. Intercurrent discomfort in his fingers, knees from osteoarthritis, intermittent mild symptoms not needing any additional medications. Labs are stable to continue current medications. Skin psoriasis-followed by Dr. Sorin Back reviewed-approximate 6% body surface area involvement-no lesions now. Previous medication-  Sulfasalazine-numbness and swelling around lips. Silver Forester PLAN:  Stay on current medications. Call me with any flares. Labs and follow-up in 3 months. Patient indicates understanding and agrees with the management plan. I reviewed patients medical information and medical history in the medical records. Note is transcribed using voice recognition software. Inadvertent computerized transcription errors may be present. ##################################################################    Subjective:  Returns for follow up  for psoriasis, psoriatic arthritis and osteoarthritis.     Lala Gar is doing very well in terms of arthritis, states that he did a lot of work in his house including  patio without any problems other than mild knee discomfort. No a.m. stiffness, swelling any joints. No flares since last seen. Skin psoriasis is in remission. Tolerating medications well. ADLs and recreational activities are normal. Denies any cough, shortness of breath, GI upset, mucositis, rashes. All other review of systems are negative. Past medical, surgical, family history- reviewed. Current Outpatient Medications   Medication Sig Dispense Refill    methotrexate (RHEUMATREX) 2.5 MG chemo tablet Take 8 tab po once a week 96 tablet 0    methotrexate (RHEUMATREX) 2.5 MG chemo tablet Take 8 tablets by mouth once a week 32 tablet 0    calcipotriene (DOVONEX) 0.005 % cream Apply to affected areas on the back twice daily. 60 g 4    clobetasol (TEMOVATE) 0.05 % ointment Apply to affected areas twice daily for up to 2 weeks or until improved. 60 g 2    folic acid (FOLVITE) 1 MG tablet TAKE 1 TABLET BY MOUTH DAILY 90 tablet 0    doxazosin (CARDURA) 4 MG tablet TAKE 1 TABLET BY MOUTH DAILY 90 tablet 1    Multiple Vitamins-Minerals (MULTIVITAMIN PO) Take  by mouth.  aspirin EC 81 MG EC tablet Take 81 mg by mouth daily.  Cholecalciferol (VITAMIN D3) 2000 UNITS CAPS Take  by mouth. No current facility-administered medications for this visit. Allergies   Allergen Reactions    Sulfasalazine Swelling     Swelling and numbness around lips.  Lisinopril Swelling     No arbs also           OBJECTIVE  Physical    Vitals:    06/24/19 0738   BP: 132/74       General appearance: alert, appears stated age and cooperative. MKS:   28 joint JOINT COUNT:                               Right                   Left   Swell Tender Swell Tender   PIP1           PIP2           PIP3           PIP4          PIP5          MCP1           MCP2        MCP3       MCP4        MCP5           Wrist           Elbow           Shoulder          Knee             Other than asymptomatic osteoarthritic changes across finger joints MCPs, PIPs, DIPs and knees.   He does not have any tender, swollen or inflamed joints in upper or lower extremities, full range of motion in all peripheral joints including strong full fist bilaterally. Normal gait and muscle strength in upper and lower extremities. Skin: no Psoriasis. No induration. No nail changes. HEENT: Normal lids and pupils. No oral or nasal ulcers. Salivary glands reveals no evidence of abnormality. Data:  Lab Results   Component Value Date    WBC 4.7 06/18/2019    RBC 4.37 06/18/2019    HGB 14.7 06/18/2019    HCT 43.5 06/18/2019     06/18/2019    MCV 99.4 06/18/2019    MCH 33.6 06/18/2019    MCHC 33.8 06/18/2019    RDW 13.6 06/18/2019    SEGSPCT 67 06/21/2014    LYMPHOPCT 13.5 06/18/2019    MONOPCT 11.0 06/18/2019    BASOPCT 0.6 06/18/2019    MONOSABS 0.5 06/18/2019    LYMPHSABS 0.6 06/18/2019    EOSABS 0.2 06/18/2019    BASOSABS 0.0 06/18/2019       Chemistry        Component Value Date/Time     08/22/2018 0715    K 4.6 08/22/2018 0715     08/22/2018 0715    CO2 26 08/22/2018 0715    BUN 11 08/22/2018 0715    CREATININE 0.8 06/18/2019 0724        Component Value Date/Time    CALCIUM 9.0 08/22/2018 0715    ALKPHOS 133 (H) 08/22/2018 0715    AST 24 06/18/2019 0724    ALT 23 06/18/2019 0724    BILITOT 0.7 08/22/2018 0715          Lab Results   Component Value Date    SEDRATE 8 06/17/2018              I thank you for giving me the opportunity to be involved in [employer] care and I look forward following Aden Lemus along with you. If you have any questions or concerns please feel free to contact me at any time.     Purnima Quick MD 06/24/19

## 2019-07-29 RX ORDER — CLOBETASOL PROPIONATE 0.5 MG/G
OINTMENT TOPICAL
Qty: 60 G | Refills: 2 | Status: SHIPPED | OUTPATIENT
Start: 2019-07-29 | End: 2019-10-22 | Stop reason: SDUPTHER

## 2019-09-06 ENCOUNTER — HOSPITAL ENCOUNTER (OUTPATIENT)
Age: 69
Discharge: HOME | End: 2019-09-06
Payer: COMMERCIAL

## 2019-09-06 DIAGNOSIS — Z79.899 HIGH RISK MEDICATION USE: ICD-10-CM

## 2019-09-06 LAB
ALT SERPL-CCNC: 24 U/L (ref 10–40)
AST SERPL-CCNC: 27 U/L (ref 15–37)
BASOPHILS ABSOLUTE: 0 K/UL (ref 0–0.2)
BASOPHILS RELATIVE PERCENT: 0.8 %
C-REACTIVE PROTEIN: 2.7 MG/L (ref 0–5.1)
CREAT SERPL-MCNC: 0.8 MG/DL (ref 0.8–1.3)
EOSINOPHILS ABSOLUTE: 0.4 K/UL (ref 0–0.6)
EOSINOPHILS RELATIVE PERCENT: 8.9 %
GFR AFRICAN AMERICAN: >60
GFR NON-AFRICAN AMERICAN: >60
HCT VFR BLD CALC: 42.6 % (ref 40.5–52.5)
HEMOGLOBIN: 14.2 G/DL (ref 13.5–17.5)
LYMPHOCYTES ABSOLUTE: 0.6 K/UL (ref 1–5.1)
LYMPHOCYTES RELATIVE PERCENT: 14.2 %
MCH RBC QN AUTO: 33.3 PG (ref 26–34)
MCHC RBC AUTO-ENTMCNC: 33.4 G/DL (ref 31–36)
MCV RBC AUTO: 99.7 FL (ref 80–100)
MONOCYTES ABSOLUTE: 0.6 K/UL (ref 0–1.3)
MONOCYTES RELATIVE PERCENT: 14.1 %
NEUTROPHILS ABSOLUTE: 2.6 K/UL (ref 1.7–7.7)
NEUTROPHILS RELATIVE PERCENT: 62 %
PDW BLD-RTO: 13.9 % (ref 12.4–15.4)
PLATELET # BLD: 158 K/UL (ref 135–450)
PMV BLD AUTO: 9.6 FL (ref 5–10.5)
RBC # BLD: 4.27 M/UL (ref 4.2–5.9)
WBC # BLD: 4.1 K/UL (ref 4–11)

## 2019-09-06 PROCEDURE — 36415 COLL VENOUS BLD VENIPUNCTURE: CPT

## 2019-09-06 PROCEDURE — 85025 COMPLETE CBC W/AUTO DIFF WBC: CPT

## 2019-09-06 PROCEDURE — 84460 ALANINE AMINO (ALT) (SGPT): CPT

## 2019-09-06 PROCEDURE — 86140 C-REACTIVE PROTEIN: CPT

## 2019-09-06 PROCEDURE — 82565 ASSAY OF CREATININE: CPT

## 2019-09-06 PROCEDURE — 84450 TRANSFERASE (AST) (SGOT): CPT

## 2019-09-25 ENCOUNTER — OFFICE VISIT (OUTPATIENT)
Dept: RHEUMATOLOGY | Age: 69
End: 2019-09-25
Payer: COMMERCIAL

## 2019-09-25 VITALS
DIASTOLIC BLOOD PRESSURE: 76 MMHG | HEIGHT: 67 IN | SYSTOLIC BLOOD PRESSURE: 124 MMHG | BODY MASS INDEX: 25.27 KG/M2 | WEIGHT: 161 LBS

## 2019-09-25 DIAGNOSIS — Z79.899 HIGH RISK MEDICATION USE: ICD-10-CM

## 2019-09-25 DIAGNOSIS — L40.9 PSORIASIS: ICD-10-CM

## 2019-09-25 DIAGNOSIS — L40.50 PSORIATIC ARTHRITIS (HCC): Primary | ICD-10-CM

## 2019-09-25 PROCEDURE — 99214 OFFICE O/P EST MOD 30 MIN: CPT | Performed by: INTERNAL MEDICINE

## 2019-09-25 NOTE — PROGRESS NOTES
and lower extremities. Skin: no Psoriasis. No induration. No nail changes. HEENT: Normal lids and pupils. No oral or nasal ulcers. Salivary glands reveals no evidence of abnormality. Data:  Lab Results   Component Value Date    WBC 4.1 09/06/2019    RBC 4.27 09/06/2019    HGB 14.2 09/06/2019    HCT 42.6 09/06/2019     09/06/2019    MCV 99.7 09/06/2019    MCH 33.3 09/06/2019    MCHC 33.4 09/06/2019    RDW 13.9 09/06/2019    SEGSPCT 67 06/21/2014    LYMPHOPCT 14.2 09/06/2019    MONOPCT 14.1 09/06/2019    BASOPCT 0.8 09/06/2019    MONOSABS 0.6 09/06/2019    LYMPHSABS 0.6 09/06/2019    EOSABS 0.4 09/06/2019    BASOSABS 0.0 09/06/2019       Chemistry        Component Value Date/Time     08/22/2018 0715    K 4.6 08/22/2018 0715     08/22/2018 0715    CO2 26 08/22/2018 0715    BUN 11 08/22/2018 0715    CREATININE 0.8 09/06/2019 0708        Component Value Date/Time    CALCIUM 9.0 08/22/2018 0715    ALKPHOS 133 (H) 08/22/2018 0715    AST 27 09/06/2019 0708    ALT 24 09/06/2019 0708    BILITOT 0.7 08/22/2018 0715          Lab Results   Component Value Date    SEDRATE 8 06/17/2018              I thank you for giving me the opportunity to be involved in [facility] care and I look forward following Savannah Avila along with you. If you have any questions or concerns please feel free to contact me at any time.     Stephany Marcos MD 09/25/19

## 2019-10-22 RX ORDER — CLOBETASOL PROPIONATE 0.5 MG/G
OINTMENT TOPICAL
Qty: 60 G | Refills: 2 | Status: SHIPPED | OUTPATIENT
Start: 2019-10-22 | End: 2020-01-02 | Stop reason: SDUPTHER

## 2019-12-17 ENCOUNTER — OFFICE VISIT (OUTPATIENT)
Dept: RHEUMATOLOGY | Age: 69
End: 2019-12-17
Payer: COMMERCIAL

## 2019-12-17 VITALS
SYSTOLIC BLOOD PRESSURE: 124 MMHG | WEIGHT: 154 LBS | HEIGHT: 67 IN | DIASTOLIC BLOOD PRESSURE: 78 MMHG | BODY MASS INDEX: 24.17 KG/M2

## 2019-12-17 DIAGNOSIS — Z79.899 HIGH RISK MEDICATION USE: ICD-10-CM

## 2019-12-17 DIAGNOSIS — L40.9 PSORIASIS: ICD-10-CM

## 2019-12-17 DIAGNOSIS — L40.50 PSORIATIC ARTHRITIS (HCC): Primary | ICD-10-CM

## 2019-12-17 PROCEDURE — 99214 OFFICE O/P EST MOD 30 MIN: CPT | Performed by: INTERNAL MEDICINE

## 2020-01-02 RX ORDER — CLOBETASOL PROPIONATE 0.5 MG/G
OINTMENT TOPICAL
Qty: 60 G | Refills: 2 | Status: SHIPPED | OUTPATIENT
Start: 2020-01-02 | End: 2020-04-08 | Stop reason: SDUPTHER

## 2020-02-27 RX ORDER — CALCIPOTRIENE 50 UG/G
CREAM TOPICAL
Qty: 60 G | Refills: 4 | Status: SHIPPED | OUTPATIENT
Start: 2020-02-27 | End: 2021-05-04

## 2020-03-13 ENCOUNTER — HOSPITAL ENCOUNTER (OUTPATIENT)
Age: 70
Discharge: HOME | End: 2020-03-13
Payer: COMMERCIAL

## 2020-03-13 LAB
ALT SERPL-CCNC: 21 U/L (ref 10–40)
AST SERPL-CCNC: 23 U/L (ref 15–37)
BASOPHILS ABSOLUTE: 0 K/UL (ref 0–0.2)
BASOPHILS RELATIVE PERCENT: 0.9 %
C-REACTIVE PROTEIN: 2.1 MG/L (ref 0–5.1)
CREAT SERPL-MCNC: 0.7 MG/DL (ref 0.8–1.3)
EOSINOPHILS ABSOLUTE: 0.3 K/UL (ref 0–0.6)
EOSINOPHILS RELATIVE PERCENT: 6.8 %
GFR AFRICAN AMERICAN: >60
GFR NON-AFRICAN AMERICAN: >60
HCT VFR BLD CALC: 43.3 % (ref 40.5–52.5)
HEMOGLOBIN: 14.7 G/DL (ref 13.5–17.5)
LYMPHOCYTES ABSOLUTE: 0.7 K/UL (ref 1–5.1)
LYMPHOCYTES RELATIVE PERCENT: 17.9 %
MCH RBC QN AUTO: 33.7 PG (ref 26–34)
MCHC RBC AUTO-ENTMCNC: 33.9 G/DL (ref 31–36)
MCV RBC AUTO: 99.5 FL (ref 80–100)
MONOCYTES ABSOLUTE: 0.4 K/UL (ref 0–1.3)
MONOCYTES RELATIVE PERCENT: 9.5 %
NEUTROPHILS ABSOLUTE: 2.7 K/UL (ref 1.7–7.7)
NEUTROPHILS RELATIVE PERCENT: 64.9 %
PDW BLD-RTO: 13.3 % (ref 12.4–15.4)
PLATELET # BLD: 153 K/UL (ref 135–450)
PMV BLD AUTO: 9.2 FL (ref 5–10.5)
RBC # BLD: 4.36 M/UL (ref 4.2–5.9)
SEDIMENTATION RATE, ERYTHROCYTE: 9 MM/HR (ref 0–20)
WBC # BLD: 4.1 K/UL (ref 4–11)

## 2020-03-13 PROCEDURE — 84450 TRANSFERASE (AST) (SGOT): CPT

## 2020-03-13 PROCEDURE — 84460 ALANINE AMINO (ALT) (SGPT): CPT

## 2020-03-13 PROCEDURE — 85025 COMPLETE CBC W/AUTO DIFF WBC: CPT

## 2020-03-13 PROCEDURE — 85652 RBC SED RATE AUTOMATED: CPT

## 2020-03-13 PROCEDURE — 82565 ASSAY OF CREATININE: CPT

## 2020-03-13 PROCEDURE — 36415 COLL VENOUS BLD VENIPUNCTURE: CPT

## 2020-03-13 PROCEDURE — 86140 C-REACTIVE PROTEIN: CPT

## 2020-03-17 ENCOUNTER — OFFICE VISIT (OUTPATIENT)
Dept: RHEUMATOLOGY | Age: 70
End: 2020-03-17
Payer: COMMERCIAL

## 2020-03-17 VITALS
WEIGHT: 154 LBS | HEIGHT: 67 IN | BODY MASS INDEX: 24.17 KG/M2 | DIASTOLIC BLOOD PRESSURE: 82 MMHG | SYSTOLIC BLOOD PRESSURE: 126 MMHG

## 2020-03-17 PROCEDURE — 99214 OFFICE O/P EST MOD 30 MIN: CPT | Performed by: INTERNAL MEDICINE

## 2020-03-17 NOTE — PROGRESS NOTES
Apply to affected areas on the back twice daily. 60 g 4    clobetasol (TEMOVATE) 0.05 % ointment Apply to affected areas twice daily for up to 2 weeks or until improved. 60 g 2    methotrexate (RHEUMATREX) 2.5 MG chemo tablet Take 8 tablets by mouth once a week 32 tablet 0    folic acid (FOLVITE) 1 MG tablet TAKE 1 TABLET BY MOUTH DAILY 90 tablet 0    doxazosin (CARDURA) 4 MG tablet TAKE 1 TABLET BY MOUTH DAILY 90 tablet 1    Multiple Vitamins-Minerals (MULTIVITAMIN PO) Take  by mouth.  aspirin EC 81 MG EC tablet Take 81 mg by mouth daily.  Cholecalciferol (VITAMIN D3) 2000 UNITS CAPS Take  by mouth. No current facility-administered medications for this visit. Allergies   Allergen Reactions    Sulfasalazine Swelling     Swelling and numbness around lips.  Lisinopril Swelling     No arbs also           OBJECTIVE  Physical    Vitals:    03/17/20 0732   BP: 126/82       General appearance: alert, appears stated age and cooperative. MKS:   28 joint JOINT COUNT:                               Right                   Left   Swell Tender Swell Tender   PIP1           PIP2           PIP3           PIP4          PIP5          MCP1           MCP2        MCP3       MCP4        MCP5           Wrist           Elbow           Shoulder          Knee             Normal musculoskeletal examination other than osteoarthritic changes and mild dorsal subluxation of MCPs bilaterally. I do not appreciate any focally tender, swollen or inflamed joints in upper or lower extremities. Normal gait and muscle strength in upper and lower extremities. Skin: no Psoriasis. No induration. No nail changes. HEENT: Normal lids and pupils. No oral or nasal ulcers. Salivary glands reveals no evidence of abnormality.        Data:  Lab Results   Component Value Date    WBC 4.1 03/13/2020    RBC 4.36 03/13/2020    HGB 14.7 03/13/2020    HCT 43.3 03/13/2020     03/13/2020    MCV 99.5 03/13/2020    MCH 33.7

## 2020-04-08 RX ORDER — CLOBETASOL PROPIONATE 0.5 MG/G
OINTMENT TOPICAL
Qty: 60 G | Refills: 2 | Status: SHIPPED | OUTPATIENT
Start: 2020-04-08 | End: 2020-06-09 | Stop reason: SDUPTHER

## 2020-06-16 ENCOUNTER — VIRTUAL VISIT (OUTPATIENT)
Dept: RHEUMATOLOGY | Age: 70
End: 2020-06-16
Payer: COMMERCIAL

## 2020-06-16 PROCEDURE — 99214 OFFICE O/P EST MOD 30 MIN: CPT | Performed by: INTERNAL MEDICINE

## 2020-09-16 ENCOUNTER — PATIENT MESSAGE (OUTPATIENT)
Dept: DERMATOLOGY | Age: 70
End: 2020-09-16

## 2020-09-16 NOTE — TELEPHONE ENCOUNTER
LOV: 5/2019    Left message informing patient that he needs to see Dr Dolly Tinajero before any refills are sent.

## 2020-09-16 NOTE — TELEPHONE ENCOUNTER
From: Poly Young  To: Brennen Tavarez MD  Sent: 9/16/2020 1:06 PM EDT  Subject: Prescription Question    Good Afternoon,    I am following up on a prescription refill request I submitted on 9/10/20 for Clobetasol. Please advise.   Thanks,    Rob Suggs [facility] [facility]

## 2020-09-17 ENCOUNTER — PATIENT MESSAGE (OUTPATIENT)
Dept: DERMATOLOGY | Age: 70
End: 2020-09-17

## 2020-09-17 ENCOUNTER — TELEPHONE (OUTPATIENT)
Dept: DERMATOLOGY | Age: 70
End: 2020-09-17

## 2020-09-17 RX ORDER — CLOBETASOL PROPIONATE 0.5 MG/G
OINTMENT TOPICAL
Qty: 60 G | Refills: 2 | Status: SHIPPED | OUTPATIENT
Start: 2020-09-17 | End: 2020-09-18 | Stop reason: SDUPTHER

## 2020-09-17 NOTE — TELEPHONE ENCOUNTER
Patient scheduled for annual follow-up in February 2021 but is requesting a medication refill to hold him over until appointment. Please advise.

## 2020-09-17 NOTE — TELEPHONE ENCOUNTER
LOV: 5/2019  Upcoming appt: 2/2021    Patient asking for one refill to last him until his next appointment. Please advise.

## 2020-09-18 RX ORDER — CLOBETASOL PROPIONATE 0.5 MG/G
OINTMENT TOPICAL
Qty: 60 G | Refills: 2 | Status: SHIPPED | OUTPATIENT
Start: 2020-09-18 | End: 2020-12-04 | Stop reason: SDUPTHER

## 2020-09-18 NOTE — TELEPHONE ENCOUNTER
From: Momo Yeung  To: Xi Gonzalez MD  Sent: 9/17/2020 8:30 PM EDT  Subject: Prescription Question    There seems to be a communication problem with my prescription refill request. I scheduled an appointment with Dr Carolina Forman which will not be until February. However, I asked that my prescription be filled at [facility]. My insurance no longer accepts [facility] as a supplier. Please review my original re-fill request which requested [facility]. The difference in price is $168 at [place] and [facility] is $28.98. Please change the refill vendor.   Thanks,  Juan José Martel \" Samuel\" Nav[facility] Fernández

## 2020-09-21 ENCOUNTER — PATIENT MESSAGE (OUTPATIENT)
Dept: DERMATOLOGY | Age: 70
End: 2020-09-21

## 2020-09-21 NOTE — TELEPHONE ENCOUNTER
From: Ellen Johnson  To: Ara Price MD  Sent: 9/21/2020 8:41 AM EDT  Subject: Prescription Question    Thank you Karmenmariocarolina Stearns. Have a great day!      ----- Message -----   From:PHOEBE Thapa   PHQX:0/09/1261 8:17 AM EDT   To:Ayan Muñoz   Subject:RE: Prescription Question    Carleen Gaxiola,     Dr Thomas Serrano sent it to [facility] for you. Thank you,   Airam Stearns       ----- Message -----   From:Ayan Burroughs   Sent:9/17/2020 8:30 PM EDT   To:Thor Harvey MD   Subject:Prescription Question    There seems to be a communication problem with my prescription refill request. I scheduled an appointment with Dr Thomas Serrano which will not be until February. However, I asked that my prescription be filled at [facility]. My insurance no longer accepts [facility] as a supplier. Please review my original re-fill request which requested [facility]. The difference in price is $168 at [employer] and [facility] is $28.98. Please change the refill vendor.   Thanks,  Yaya Godinez \" Samuel\" Vane Smith

## 2020-09-22 ENCOUNTER — PATIENT MESSAGE (OUTPATIENT)
Dept: DERMATOLOGY | Age: 70
End: 2020-09-22

## 2020-09-22 NOTE — TELEPHONE ENCOUNTER
From: Zia Momin  To: Judy Cortez MD  Sent: 9/22/2020 3:59 PM EDT  Subject: Prescription Question    Thank you Jeffery Nunez. Have a great rest of your day.      ----- Message -----   From:SIERRAN Marguerite Halsted   GUBC:9/[birth year]/1819 3:35 PM EDT   To:Ayan Molina   Subject:RE: Prescription Question    Henok Baker,     [facility] and confirmed that the prescription for Clobetasol ointment was received. Thank you,   Jeffery Nunez       ----- Message -----   From:Ayan Molina   DSNY:1/03/0415 2:36 PM EDT   To:Thor Foss MD   Subject:Prescription Question    Yasmin Boeck,    I got your message on Monday that Dr. Hosea Zehng was correcting the prescription from [facility] to [facility] but [facility] has not received any prescription refill request. Can you check again please?   Thanks,  Jim Norris

## 2020-09-22 NOTE — TELEPHONE ENCOUNTER
From: Reena Menezes  To: Sheron Lala MD  Sent: 9/22/2020 2:36 PM EDT  Subject: Prescription Question    Andra San,    I got your message on Monday that Dr. Olan Olszewski was correcting the prescription from [facility] to [facility] but [facility] has not received any prescription refill request. Can you check again please?   Thanks,  Elio Ramos

## 2021-01-22 ENCOUNTER — OFFICE VISIT (OUTPATIENT)
Dept: RHEUMATOLOGY | Age: 71
End: 2021-01-22
Payer: COMMERCIAL

## 2021-01-22 VITALS — HEIGHT: 67 IN | TEMPERATURE: 97.5 F | BODY MASS INDEX: 24.17 KG/M2 | WEIGHT: 154 LBS

## 2021-01-22 DIAGNOSIS — L40.50 PSORIATIC ARTHRITIS (HCC): Primary | ICD-10-CM

## 2021-01-22 DIAGNOSIS — L40.9 PSORIASIS: ICD-10-CM

## 2021-01-22 DIAGNOSIS — Z79.899 HIGH RISK MEDICATION USE: ICD-10-CM

## 2021-01-22 PROCEDURE — 99214 OFFICE O/P EST MOD 30 MIN: CPT | Performed by: INTERNAL MEDICINE

## 2021-01-22 NOTE — PROGRESS NOTES
[address] Araujo MD                                                                                                [phone] (x) [phone] (F)      Primary provider: Kimmie Hooks DO  Patient identification: Aime Burroughs,: 1950,Sex: male       ASSESSMENT:  Karlo Shepherd was seen today for follow-up. Diagnoses and all orders for this visit:    Psoriatic arthritis ([address]    Psoriasis    High risk medication use    Other orders  -     methotrexate (RHEUMATREX) 2.5 MG chemo tablet; Take 8 Tabs po once a week, same day every week,  split dose 6-8 hours apart on the same day. Psoriasis-asymptomatic. Psoriatic arthritis flared in his left second and third finger with dactylitis when he did not take methotrexate for about a month due to COVID-19 exposure. Restarted methotrexate last week is helping with the symptoms. He also had multiple trigger fingers which were injected by hand surgeon, that also helped with arthritis flare. Doing much better now. Safety labs are normal from 2020-reviewed from 2020 [facility] normal CBCD and chemistry. Osteoarthritis generalized-finger joints, DJD spine-stable. Skin psoriasis-followed by Dr. Willian Dotson reviewed-approximate 6% body surface area involvement-no lesions now. Previous medication-  Sulfasalazine-numbness and swelling around lips. Lysbeth Carrel PLAN:  Continue current medications, methotrexate refill, lab work in 3 months prior to next visit. Follow up in 3 months. Patient indicates understanding and agrees with the management plan. I reviewed patients medical information and medical history in the medical records. Note is transcribed using voice recognition software.  Inadvertent computerized transcription errors may be present. ##################################################################    Subjective:  Returns for follow up  for psoriasis, psoriatic arthritis and osteoarthritis. Interval changes-  He was exposed to COVID-19 infection around [place] time and methotrexate has been on hold since then. He noticed pain, swelling and stiffness of his left second third fourth finger-rather abrupt in onset, was getting worse and resumed methotrexate last week that has helped with his symptoms. He also saw a hand surgeon because of trigger finger, 3 trigger fingers were injected and is doing better. Normal ADLs and recreational activities at this time. Tolerating medications well. Denies any cough, shortness of breath, GI upset, mucositis, rashes. All other review of systems are negative. Past medical, surgical, family history- reviewed. Current Outpatient Medications   Medication Sig Dispense Refill    methotrexate (RHEUMATREX) 2.5 MG chemo tablet Take 8 Tabs po once a week, same day every week,  split dose 6-8 hours apart on the same day. 104 tablet 0    clobetasol (TEMOVATE) 0.05 % ointment Apply to affected areas twice daily for up to 2 weeks or until improved. 60 g 0    methotrexate (RHEUMATREX) 2.5 MG chemo tablet Take 8 tablets by mouth once a week 96 tablet 0    calcipotriene (DOVONEX) 0.005 % cream Apply to affected areas on the back twice daily. 60 g 4    folic acid (FOLVITE) 1 MG tablet TAKE 1 TABLET BY MOUTH DAILY 90 tablet 0    doxazosin (CARDURA) 4 MG tablet TAKE 1 TABLET BY MOUTH DAILY 90 tablet 1    Multiple Vitamins-Minerals (MULTIVITAMIN PO) Take  by mouth.  aspirin EC 81 MG EC tablet Take 81 mg by mouth daily.  Cholecalciferol (VITAMIN D3) 2000 UNITS CAPS Take  by mouth. No current facility-administered medications for this visit. Allergies   Allergen Reactions    Sulfasalazine Swelling     Swelling and numbness around lips.     Lisinopril Swelling     No arbs also OBJECTIVE  Physical    Vitals:    01/22/21 1141   Temp: 97.5 °F (36.4 °C)       General appearance: alert, appears stated age and cooperative. MKS:   28 joint JOINT COUNT:                               Right                   Left   Swell Tender Swell Tender   PIP1           PIP2           PIP3           PIP4          PIP5          MCP1           MCP2        MCP3       MCP4        MCP5           Wrist           Elbow           Shoulder          Knee             Mild residual dactylitis noted in his left long finger, otherwise I do not appreciate any focally tender, swollen or inflamed joints in the upper or lower extremities, full range of motion all peripheral joints. Normal gait   Skin: no Psoriasis. No induration. No nail changes. Data:  Lab Results   Component Value Date    WBC 4.1 03/13/2020    RBC 4.36 03/13/2020    HGB 14.7 03/13/2020    HCT 43.3 03/13/2020     03/13/2020    MCV 99.5 03/13/2020    MCH 33.7 03/13/2020    MCHC 33.9 03/13/2020    RDW 13.3 03/13/2020    SEGSPCT 67 06/21/2014    LYMPHOPCT 17.9 03/13/2020    MONOPCT 9.5 03/13/2020    BASOPCT 0.9 03/13/2020    MONOSABS 0.4 03/13/2020    LYMPHSABS 0.7 03/13/2020    EOSABS 0.3 03/13/2020    BASOSABS 0.0 03/13/2020       Chemistry        Component Value Date/Time     08/22/2018 0715    K 4.6 08/22/2018 0715     08/22/2018 0715    CO2 26 08/22/2018 0715    BUN 11 08/22/2018 0715    CREATININE 0.7 (L) 03/13/2020 0707        Component Value Date/Time    CALCIUM 9.0 08/22/2018 0715    ALKPHOS 133 (H) 08/22/2018 0715    AST 23 03/13/2020 0707    ALT 21 03/13/2020 0707    BILITOT 0.7 08/22/2018 0715          Lab Results   Component Value Date    SEDRATE 9 03/13/2020              I thank you for giving me the opportunity to be involved in [employer] care and I look forward following Kirby Abrams along with you. If you have any questions or concerns please feel free to contact me at any time.     Sharmila Madrigal MD 01/22/21

## 2021-02-15 ENCOUNTER — PATIENT MESSAGE (OUTPATIENT)
Dept: DERMATOLOGY | Age: 71
End: 2021-02-15

## 2021-02-15 RX ORDER — CLOBETASOL PROPIONATE 0.5 MG/G
OINTMENT TOPICAL
Qty: 60 G | Refills: 0 | Status: SHIPPED | OUTPATIENT
Start: 2021-02-15 | End: 2021-04-14 | Stop reason: SDUPTHER

## 2021-02-15 NOTE — TELEPHONE ENCOUNTER
From: Billie Fisher  To: Saniya Seo MD  Sent: 2/15/2021 8:49 AM EST  Subject: Visit Follow-Up Question    Good Morning Dr. Jillian Maradiaga,    Although I have confirmed my appointment for tomorrow a lot will depend on the weather. Right now they are calling for the most snow overnight and into Tuesday afternoon. This may impact my ability to get to your office. We will play it by ear for now, if that is OK?   Thanks,  Minnie Mathewr \"Sam[facility]\" [employer]

## 2021-04-14 RX ORDER — CLOBETASOL PROPIONATE 0.5 MG/G
OINTMENT TOPICAL
Qty: 60 G | Refills: 0 | Status: SHIPPED | OUTPATIENT
Start: 2021-04-14 | End: 2021-06-02 | Stop reason: SDUPTHER

## 2021-04-22 ENCOUNTER — HOSPITAL ENCOUNTER (OUTPATIENT)
Age: 71
Discharge: HOME | End: 2021-04-22
Payer: COMMERCIAL

## 2021-04-22 DIAGNOSIS — Z79.899 HIGH RISK MEDICATION USE: ICD-10-CM

## 2021-04-22 LAB
ALT SERPL-CCNC: 22 U/L (ref 10–40)
AST SERPL-CCNC: 24 U/L (ref 15–37)
BASOPHILS ABSOLUTE: 0 K/UL (ref 0–0.2)
BASOPHILS RELATIVE PERCENT: 0.7 %
C-REACTIVE PROTEIN: <3 MG/L (ref 0–5.1)
CREAT SERPL-MCNC: 0.8 MG/DL (ref 0.8–1.3)
EOSINOPHILS ABSOLUTE: 0.2 K/UL (ref 0–0.6)
EOSINOPHILS RELATIVE PERCENT: 3.9 %
GFR AFRICAN AMERICAN: >60
GFR NON-AFRICAN AMERICAN: >60
HCT VFR BLD CALC: 42.3 % (ref 40.5–52.5)
HEMOGLOBIN: 14.4 G/DL (ref 13.5–17.5)
LYMPHOCYTES ABSOLUTE: 0.6 K/UL (ref 1–5.1)
LYMPHOCYTES RELATIVE PERCENT: 10.7 %
MCH RBC QN AUTO: 34.3 PG (ref 26–34)
MCHC RBC AUTO-ENTMCNC: 34 G/DL (ref 31–36)
MCV RBC AUTO: 100.9 FL (ref 80–100)
MONOCYTES ABSOLUTE: 0.5 K/UL (ref 0–1.3)
MONOCYTES RELATIVE PERCENT: 9.1 %
NEUTROPHILS ABSOLUTE: 4.1 K/UL (ref 1.7–7.7)
NEUTROPHILS RELATIVE PERCENT: 75.6 %
PDW BLD-RTO: 14.9 % (ref 12.4–15.4)
PLATELET # BLD: 155 K/UL (ref 135–450)
PMV BLD AUTO: 8.9 FL (ref 5–10.5)
RBC # BLD: 4.19 M/UL (ref 4.2–5.9)
WBC # BLD: 5.4 K/UL (ref 4–11)

## 2021-04-22 PROCEDURE — 84460 ALANINE AMINO (ALT) (SGPT): CPT

## 2021-04-22 PROCEDURE — 84450 TRANSFERASE (AST) (SGOT): CPT

## 2021-04-22 PROCEDURE — 85025 COMPLETE CBC W/AUTO DIFF WBC: CPT

## 2021-04-22 PROCEDURE — 82565 ASSAY OF CREATININE: CPT

## 2021-04-22 PROCEDURE — 36415 COLL VENOUS BLD VENIPUNCTURE: CPT

## 2021-04-22 PROCEDURE — 86140 C-REACTIVE PROTEIN: CPT

## 2021-04-23 ENCOUNTER — OFFICE VISIT (OUTPATIENT)
Dept: RHEUMATOLOGY | Age: 71
End: 2021-04-23
Payer: COMMERCIAL

## 2021-04-23 VITALS — HEIGHT: 67 IN | WEIGHT: 154 LBS | BODY MASS INDEX: 24.17 KG/M2 | TEMPERATURE: 97.6 F

## 2021-04-23 DIAGNOSIS — L40.50 PSORIATIC ARTHRITIS (HCC): Primary | ICD-10-CM

## 2021-04-23 DIAGNOSIS — Z79.899 HIGH RISK MEDICATION USE: ICD-10-CM

## 2021-04-23 DIAGNOSIS — L40.9 PSORIASIS: ICD-10-CM

## 2021-04-23 PROCEDURE — 99214 OFFICE O/P EST MOD 30 MIN: CPT | Performed by: INTERNAL MEDICINE

## 2021-04-23 NOTE — PROGRESS NOTES
medications well. All other review of systems are negative. Past medical, surgical, family history- reviewed. Current Outpatient Medications   Medication Sig Dispense Refill    methotrexate (RHEUMATREX) 2.5 MG chemo tablet Take 8 Tabs po once a week, same day every week,  split dose 6-8 hours apart on the same day. 104 tablet 0    clobetasol (TEMOVATE) 0.05 % ointment Apply to affected areas twice daily for up to 2 weeks or until improved. 60 g 0    methotrexate (RHEUMATREX) 2.5 MG chemo tablet Take 8 Tabs po once a week, same day every week,  split dose 6-8 hours apart on the same day. 104 tablet 0    methotrexate (RHEUMATREX) 2.5 MG chemo tablet Take 8 tablets by mouth once a week 96 tablet 0    calcipotriene (DOVONEX) 0.005 % cream Apply to affected areas on the back twice daily. 60 g 4    folic acid (FOLVITE) 1 MG tablet TAKE 1 TABLET BY MOUTH DAILY 90 tablet 0    doxazosin (CARDURA) 4 MG tablet TAKE 1 TABLET BY MOUTH DAILY 90 tablet 1    Multiple Vitamins-Minerals (MULTIVITAMIN PO) Take  by mouth.  aspirin EC 81 MG EC tablet Take 81 mg by mouth daily.  Cholecalciferol (VITAMIN D3) 2000 UNITS CAPS Take  by mouth. No current facility-administered medications for this visit. Allergies   Allergen Reactions    Sulfasalazine Swelling     Swelling and numbness around lips.  Lisinopril Swelling     No arbs also           OBJECTIVE  Physical    Vitals:    04/23/21 0737   Temp: 97.6 °F (36.4 °C)       General appearance: alert, appears stated age and cooperative.      MKS:   28 joint JOINT COUNT:                               Right                   Left   Swell Tender Swell Tender   PIP1           PIP2           PIP3           PIP4          PIP5          MCP1           MCP2        MCP3       MCP4        MCP5           Wrist           Elbow           Shoulder          Knee             Other than asymptomatic OA changes in his scattered finger joints, knees, I do not appreciate any focally tender, swollen or inflamed joints in the upper or lower extremities, full range of motion all peripheral joints. No dactylitis, enthesitis. Normal gait   Skin: no Psoriasis. No induration. No nail changes. Data:  Lab Results   Component Value Date    WBC 5.4 04/22/2021    RBC 4.19 04/22/2021    HGB 14.4 04/22/2021    HCT 42.3 04/22/2021     04/22/2021    .9 04/22/2021    MCH 34.3 04/22/2021    MCHC 34.0 04/22/2021    RDW 14.9 04/22/2021    SEGSPCT 67 06/21/2014    LYMPHOPCT 10.7 04/22/2021    MONOPCT 9.1 04/22/2021    BASOPCT 0.7 04/22/2021    MONOSABS 0.5 04/22/2021    LYMPHSABS 0.6 04/22/2021    EOSABS 0.2 04/22/2021    BASOSABS 0.0 04/22/2021       Chemistry        Component Value Date/Time     08/22/2018 0715    K 4.6 08/22/2018 0715     08/22/2018 0715    CO2 26 08/22/2018 0715    BUN 11 08/22/2018 0715    CREATININE 0.8 04/22/2021 0728        Component Value Date/Time    CALCIUM 9.0 08/22/2018 0715    ALKPHOS 133 (H) 08/22/2018 0715    AST 24 04/22/2021 0728    ALT 22 04/22/2021 0728    BILITOT 0.7 08/22/2018 0715          Lab Results   Component Value Date    SEDRATE 9 03/13/2020              I thank you for giving me the opportunity to be involved in [facility] care and I look forward following Alicia Damon along with you. If you have any questions or concerns please feel free to contact me at any time. Total time 33 minutes that includes the following-  Preparing to see the patient such as reviewing patients records, pre-charting, preparing the visit on the same day, performing a medically appropriate history and physical examination, counseling and educating patient about diagnosis, management plan, ordering appropriate testings, prescriptions, communicating findings to other care providers, and documenting clinical information in electronic medical record.     Ronnell Martinez MD 04/23/21

## 2021-05-04 ENCOUNTER — OFFICE VISIT (OUTPATIENT)
Dept: DERMATOLOGY | Age: 71
End: 2021-05-04
Payer: COMMERCIAL

## 2021-05-04 VITALS — TEMPERATURE: 98.9 F

## 2021-05-04 DIAGNOSIS — L40.0 PSORIASIS VULGARIS: Primary | ICD-10-CM

## 2021-05-04 PROCEDURE — 99213 OFFICE O/P EST LOW 20 MIN: CPT | Performed by: DERMATOLOGY

## 2021-05-04 NOTE — PROGRESS NOTES
tablet Take 8 tablets by mouth once a week 96 tablet 0    folic acid (FOLVITE) 1 MG tablet TAKE 1 TABLET BY MOUTH DAILY 90 tablet 0    doxazosin (CARDURA) 4 MG tablet TAKE 1 TABLET BY MOUTH DAILY 90 tablet 1    Multiple Vitamins-Minerals (MULTIVITAMIN PO) Take  by mouth.  aspirin EC 81 MG EC tablet Take 81 mg by mouth daily.  Cholecalciferol (VITAMIN D3) 2000 UNITS CAPS Take  by mouth. 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 The following were examined and determined to be abnormal: Back, RUE, LUE and RLE. Well appearing. 1.  Elbows, right knee with few minimally scaly pink papules and small plaques. Mid to lower portion of the back with several well-defined scaly erythematous macules and patches. Assessment and Plan     1. Psoriasis vulgaris with coexistent psoriatic arthritis-2% BSA on methotrexate therapy    Continue methotrexate per rheumatology. Consider TNF alpha inhibitor in the future if skin worsens. Clobetasol ointment 1-2 times daily as needed to affected areas until improved. He reports being up-to-date on vaccinations. He also has had his physical this year where his blood pressure was checked along with lipid profile. We discussed the association of psoriasis with cardiovascular disease. Return in about 1 year (around 5/4/2022).     --Sridevi Nguyen MD

## 2021-06-02 RX ORDER — CLOBETASOL PROPIONATE 0.5 MG/G
OINTMENT TOPICAL
Qty: 60 G | Refills: 0 | Status: SHIPPED | OUTPATIENT
Start: 2021-06-02 | End: 2021-08-20 | Stop reason: SDUPTHER

## 2021-08-16 DIAGNOSIS — Z79.899 HIGH RISK MEDICATION USE: Primary | ICD-10-CM

## 2021-08-20 ENCOUNTER — HOSPITAL ENCOUNTER (OUTPATIENT)
Age: 71
Discharge: HOME | End: 2021-08-20
Payer: COMMERCIAL

## 2021-08-20 DIAGNOSIS — Z79.899 HIGH RISK MEDICATION USE: ICD-10-CM

## 2021-08-20 LAB
ALT SERPL-CCNC: 24 U/L (ref 10–40)
AST SERPL-CCNC: 23 U/L (ref 15–37)
BASOPHILS ABSOLUTE: 0 K/UL (ref 0–0.2)
BASOPHILS RELATIVE PERCENT: 1 %
C-REACTIVE PROTEIN: 3.9 MG/L (ref 0–5.1)
CREAT SERPL-MCNC: 0.9 MG/DL (ref 0.8–1.3)
EOSINOPHILS ABSOLUTE: 0.2 K/UL (ref 0–0.6)
EOSINOPHILS RELATIVE PERCENT: 5.9 %
GFR AFRICAN AMERICAN: >60
GFR NON-AFRICAN AMERICAN: >60
HCT VFR BLD CALC: 43.1 % (ref 40.5–52.5)
HEMOGLOBIN: 14.7 G/DL (ref 13.5–17.5)
LYMPHOCYTES ABSOLUTE: 0.5 K/UL (ref 1–5.1)
LYMPHOCYTES RELATIVE PERCENT: 16.1 %
MCH RBC QN AUTO: 34.3 PG (ref 26–34)
MCHC RBC AUTO-ENTMCNC: 34 G/DL (ref 31–36)
MCV RBC AUTO: 100.7 FL (ref 80–100)
MONOCYTES ABSOLUTE: 0.4 K/UL (ref 0–1.3)
MONOCYTES RELATIVE PERCENT: 11.4 %
NEUTROPHILS ABSOLUTE: 2.1 K/UL (ref 1.7–7.7)
NEUTROPHILS RELATIVE PERCENT: 65.6 %
PDW BLD-RTO: 13.3 % (ref 12.4–15.4)
PLATELET # BLD: 156 K/UL (ref 135–450)
PMV BLD AUTO: 9.3 FL (ref 5–10.5)
RBC # BLD: 4.28 M/UL (ref 4.2–5.9)
SEDIMENTATION RATE, ERYTHROCYTE: 15 MM/HR (ref 0–20)
WBC # BLD: 3.2 K/UL (ref 4–11)

## 2021-08-20 PROCEDURE — 82565 ASSAY OF CREATININE: CPT

## 2021-08-20 PROCEDURE — 84450 TRANSFERASE (AST) (SGOT): CPT

## 2021-08-20 PROCEDURE — 36415 COLL VENOUS BLD VENIPUNCTURE: CPT

## 2021-08-20 PROCEDURE — 85025 COMPLETE CBC W/AUTO DIFF WBC: CPT

## 2021-08-20 PROCEDURE — 86140 C-REACTIVE PROTEIN: CPT

## 2021-08-20 PROCEDURE — 85652 RBC SED RATE AUTOMATED: CPT

## 2021-08-20 PROCEDURE — 84460 ALANINE AMINO (ALT) (SGPT): CPT

## 2021-08-20 RX ORDER — CLOBETASOL PROPIONATE 0.5 MG/G
OINTMENT TOPICAL
Qty: 60 G | Refills: 0 | Status: SHIPPED | OUTPATIENT
Start: 2021-08-20 | End: 2021-11-20 | Stop reason: SDUPTHER

## 2021-08-23 ENCOUNTER — OFFICE VISIT (OUTPATIENT)
Dept: RHEUMATOLOGY | Age: 71
End: 2021-08-23
Payer: COMMERCIAL

## 2021-08-23 VITALS
DIASTOLIC BLOOD PRESSURE: 76 MMHG | HEIGHT: 67 IN | WEIGHT: 154 LBS | BODY MASS INDEX: 24.17 KG/M2 | SYSTOLIC BLOOD PRESSURE: 120 MMHG

## 2021-08-23 DIAGNOSIS — L40.9 PSORIASIS: ICD-10-CM

## 2021-08-23 DIAGNOSIS — L40.50 PSORIATIC ARTHRITIS (HCC): Primary | ICD-10-CM

## 2021-08-23 DIAGNOSIS — Z79.899 HIGH RISK MEDICATION USE: ICD-10-CM

## 2021-08-23 PROCEDURE — 99214 OFFICE O/P EST MOD 30 MIN: CPT | Performed by: INTERNAL MEDICINE

## 2021-08-23 NOTE — PROGRESS NOTES
[address] Araujo MD                                                                                                [phone] (A) [phone] (F)    Note is transcribed using voice recognition software. Inadvertent computerized transcription errors may be present. Primary provider: Emanuel Cueva DO  Patient identification: Cady Burroughs,: 1950,Sex: male       ASSESSMENT:  Panda was seen today for follow-up. Diagnoses and all orders for this visit:    Psoriatic arthritis ([address]    Psoriasis    High risk medication use         Psoriasis and psoriatic arthritis-in clinical remission. On methotrexate 20 mg weekly. Psoriasis-previously had 6% body surface area, Dr. Judy Washington.  Notes reviewed. Osteoarthritis-recent flare in his knees, also had a Baker's cyst in left knee, better after steroid Dosepak. Asymptomatic now. Previous medication-  Sulfasalazine-numbness and swelling around lips. Natanael Albrecht PLAN:  Methotrexate safety labs are normal except mild leukopenia with lymphopenia. Will clinically monitor. Continue current dose of methotrexate. Call us with any musculoskeletal concerns. Recommend COVID-19 booster dose. Follow-up in 3 months. Patient indicates understanding and agrees with the management plan. ##################################################################    Subjective:  Returns for follow up  for psoriasis, psoriatic arthritis and osteoarthritis. Interval changes-states that last month left knee pain, swelling took him to urgent care as well as seeing primary provider. Was prescribed 2 doses of Medrol Dosepak that helped. Asymptomatic now. Does not recall any injury or fall. Other joints are asymptomatic. Psoriasis is doing well. Normal ADLs and recreational activities. Tolerating medications well.   Rest of review of systems are negative. Past medical, surgical, family history- reviewed. Current Outpatient Medications   Medication Sig Dispense Refill    methotrexate (RHEUMATREX) 2.5 MG chemo tablet Take 8 Tabs po once a week, same day every week,  split dose 6-8 hours apart on the same day. 104 tablet 0    clobetasol (TEMOVATE) 0.05 % ointment Apply to affected areas twice daily for up to 2 weeks or until improved. 60 g 0    methotrexate (RHEUMATREX) 2.5 MG chemo tablet Take 8 Tabs po once a week, same day every week,  split dose 6-8 hours apart on the same day. 104 tablet 0    methotrexate (RHEUMATREX) 2.5 MG chemo tablet Take 8 tablets by mouth once a week 96 tablet 0    folic acid (FOLVITE) 1 MG tablet TAKE 1 TABLET BY MOUTH DAILY 90 tablet 0    doxazosin (CARDURA) 4 MG tablet TAKE 1 TABLET BY MOUTH DAILY 90 tablet 1    Multiple Vitamins-Minerals (MULTIVITAMIN PO) Take  by mouth.  aspirin EC 81 MG EC tablet Take 81 mg by mouth daily.  Cholecalciferol (VITAMIN D3) 2000 UNITS CAPS Take  by mouth. No current facility-administered medications for this visit. Allergies   Allergen Reactions    Sulfasalazine Swelling     Swelling and numbness around lips.  Lisinopril Swelling     No arbs also           OBJECTIVE  Physical    Vitals:    08/23/21 0745   BP: 120/76       General appearance: alert, appears stated age and cooperative. MKS:   28 joint JOINT COUNT:                               Right                   Left   Swell Tender Swell Tender   PIP1           PIP2           PIP3           PIP4          PIP5          MCP1           MCP2        MCP3       MCP4        MCP5           Wrist           Elbow           Shoulder          Knee             Other than asymptomatic OA changes across his finger joints, knees-no appreciable tender swollen inflamed joints, F ROM in all peripheral joints. No dactylitis enthesitis. Skin: no Psoriasis. No induration. No nail changes.     Data:  Lab Results   Component Value Date    WBC 3.2 08/20/2021    RBC 4.28 08/20/2021    HGB 14.7 08/20/2021    HCT 43.1 08/20/2021     08/20/2021    .7 08/20/2021    MCH 34.3 08/20/2021    MCHC 34.0 08/20/2021    RDW 13.3 08/20/2021    SEGSPCT 67 06/21/2014    LYMPHOPCT 16.1 08/20/2021    MONOPCT 11.4 08/20/2021    BASOPCT 1.0 08/20/2021    MONOSABS 0.4 08/20/2021    LYMPHSABS 0.5 08/20/2021    EOSABS 0.2 08/20/2021    BASOSABS 0.0 08/20/2021       Chemistry        Component Value Date/Time     08/22/2018 0715    K 4.6 08/22/2018 0715     08/22/2018 0715    CO2 26 08/22/2018 0715    BUN 11 08/22/2018 0715    CREATININE 0.9 08/20/2021 0731        Component Value Date/Time    CALCIUM 9.0 08/22/2018 0715    ALKPHOS 133 (H) 08/22/2018 0715    AST 23 08/20/2021 0731    ALT 24 08/20/2021 0731    BILITOT 0.7 08/22/2018 0715          Lab Results   Component Value Date    SEDRATE 15 08/20/2021              I thank you for giving me the opportunity to be involved in [facility] care and I look forward following Francisco Gamboa along with you. If you have any questions or concerns please feel free to contact me at any time. Total time 32 minutes that includes the following-  Preparing to see the patient such as reviewing patients records, pre-charting, preparing the visit on the same day, performing a medically appropriate history and physical examination, counseling and educating patient about diagnosis, management plan, ordering appropriate testings, prescriptions, communicating findings to other care providers, and documenting clinical information in electronic medical record.     Luis Miguel Welsh MD 08/23/21

## 2021-11-22 ENCOUNTER — OFFICE VISIT (OUTPATIENT)
Dept: RHEUMATOLOGY | Age: 71
End: 2021-11-22
Payer: COMMERCIAL

## 2021-11-22 VITALS
SYSTOLIC BLOOD PRESSURE: 130 MMHG | WEIGHT: 155 LBS | HEIGHT: 67 IN | DIASTOLIC BLOOD PRESSURE: 80 MMHG | BODY MASS INDEX: 24.33 KG/M2

## 2021-11-22 DIAGNOSIS — L40.50 PSORIATIC ARTHRITIS (HCC): Primary | ICD-10-CM

## 2021-11-22 DIAGNOSIS — Z79.899 HIGH RISK MEDICATION USE: ICD-10-CM

## 2021-11-22 DIAGNOSIS — L40.9 PSORIASIS: ICD-10-CM

## 2021-11-22 LAB
ALT SERPL-CCNC: 22 U/L (ref 10–40)
AST SERPL-CCNC: 33 U/L (ref 15–37)
BASOPHILS ABSOLUTE: 0 K/UL (ref 0–0.2)
BASOPHILS RELATIVE PERCENT: 0.8 %
C-REACTIVE PROTEIN: <3 MG/L (ref 0–5.1)
CREAT SERPL-MCNC: 0.7 MG/DL (ref 0.8–1.3)
EOSINOPHILS ABSOLUTE: 0.2 K/UL (ref 0–0.6)
EOSINOPHILS RELATIVE PERCENT: 3.9 %
GFR AFRICAN AMERICAN: >60
GFR NON-AFRICAN AMERICAN: >60
HCT VFR BLD CALC: 42 % (ref 40.5–52.5)
HEMOGLOBIN: 13.8 G/DL (ref 13.5–17.5)
LYMPHOCYTES ABSOLUTE: 0.6 K/UL (ref 1–5.1)
LYMPHOCYTES RELATIVE PERCENT: 14.6 %
MCH RBC QN AUTO: 34.2 PG (ref 26–34)
MCHC RBC AUTO-ENTMCNC: 33 G/DL (ref 31–36)
MCV RBC AUTO: 103.6 FL (ref 80–100)
MONOCYTES ABSOLUTE: 0.5 K/UL (ref 0–1.3)
MONOCYTES RELATIVE PERCENT: 11.7 %
NEUTROPHILS ABSOLUTE: 2.7 K/UL (ref 1.7–7.7)
NEUTROPHILS RELATIVE PERCENT: 69 %
PDW BLD-RTO: 14.6 % (ref 12.4–15.4)
PLATELET # BLD: 138 K/UL (ref 135–450)
PMV BLD AUTO: 9.4 FL (ref 5–10.5)
RBC # BLD: 4.05 M/UL (ref 4.2–5.9)
SEDIMENTATION RATE, ERYTHROCYTE: 8 MM/HR (ref 0–20)
WBC # BLD: 3.9 K/UL (ref 4–11)

## 2021-11-22 PROCEDURE — 99214 OFFICE O/P EST MOD 30 MIN: CPT | Performed by: INTERNAL MEDICINE

## 2021-11-22 RX ORDER — CLOBETASOL PROPIONATE 0.5 MG/G
OINTMENT TOPICAL
Qty: 60 G | Refills: 0 | Status: SHIPPED | OUTPATIENT
Start: 2021-11-22 | End: 2022-01-20 | Stop reason: SDUPTHER

## 2021-11-22 NOTE — PROGRESS NOTES
[address] Caesar Araujo MD                                                                                                 (K) [phone] (F)    Note is transcribed using voice recognition software. Inadvertent computerized transcription errors may be present. Primary provider: Dayanara Fernandez DO  Patient identification: Tyra Burroughs,: 1950,Sex: male       ASSESSMENT:  Sandi Chan was seen today for follow-up. Diagnoses and all orders for this visit:    Psoriatic arthritis ([facility] [address]    Psoriasis    High risk medication use  -     AST(SGOT) & ALT(SGPT); Standing  -     CBC Auto Differential; Standing  -     C-Reactive Protein; Standing  -     Creatinine, Serum; Standing  -     Sedimentation Rate; Standing    Other orders  -     methotrexate (RHEUMATREX) 2.5 MG chemo tablet; Take 8 Tabs po once a week, same day every week,  split dose 6-8 hours apart on the same day. Safety alert: Labs as recommended by prescribing MD.       Assessment and plan as 2021    Psoriasis and psoriatic arthritis-doing well on  methotrexate 20 mg weekly. Check chemotherapy safety labs today to monitor for organ involvement. Prescription renewed. Psoriasis-previously had 6% body surface area, Dr. Tory Walters.  Jennifer Rose intercurrent arthralgias  Previous medication-  Sulfasalazine-numbness and swelling around lips. .    Follow-up in 3 months. Patient indicates understanding and agrees with the management plan. ##################################################################    Subjective:  Returns for follow up  for psoriasis, psoriatic arthritis and osteoarthritis. Interval changes-other than minor intercurrent arthralgias from osteoarthritis, doing well. No flares of psoriasis psoriatic arthritis. Normal ADLs and recreational activities. Tolerating medications well. Rest of review of systems are negative. Past medical, surgical, family history- reviewed. Current Outpatient Medications   Medication Sig Dispense Refill    methotrexate (RHEUMATREX) 2.5 MG chemo tablet Take 8 Tabs po once a week, same day every week,  split dose 6-8 hours apart on the same day. Safety alert: Labs as recommended by prescribing MD. 104 tablet 0    methotrexate (RHEUMATREX) 2.5 MG chemo tablet Take 8 Tabs po once a week, same day every week,  split dose 6-8 hours apart on the same day. 104 tablet 0    clobetasol (TEMOVATE) 0.05 % ointment Apply to affected areas twice daily for up to 2 weeks or until improved. 60 g 0    methotrexate (RHEUMATREX) 2.5 MG chemo tablet Take 8 Tabs po once a week, same day every week,  split dose 6-8 hours apart on the same day. 104 tablet 0    methotrexate (RHEUMATREX) 2.5 MG chemo tablet Take 8 tablets by mouth once a week 96 tablet 0    folic acid (FOLVITE) 1 MG tablet TAKE 1 TABLET BY MOUTH DAILY 90 tablet 0    doxazosin (CARDURA) 4 MG tablet TAKE 1 TABLET BY MOUTH DAILY 90 tablet 1    Multiple Vitamins-Minerals (MULTIVITAMIN PO) Take  by mouth.  aspirin EC 81 MG EC tablet Take 81 mg by mouth daily.  Cholecalciferol (VITAMIN D3) 2000 UNITS CAPS Take  by mouth. No current facility-administered medications for this visit. Allergies   Allergen Reactions    Sulfasalazine Swelling     Swelling and numbness around lips.  Lisinopril Swelling     No arbs also           OBJECTIVE  Physical    Vitals:    11/22/21 0718   BP: 130/80       General appearance: alert, appears stated age and cooperative.      MKS:   28 joint JOINT COUNT:                               Right                   Left   Swell Tender Swell Tender   PIP1           PIP2           PIP3           PIP4          PIP5          MCP1           MCP2        MCP3       MCP4        MCP5           Wrist           Elbow           Shoulder          Knee             He does not have any tender swollen inflamed joints in upper or lower extremities, F ROM in all peripheral joints. Asymptomatic OA changes across his MCPs, PIPs, DIPs and knees. Skin: no Psoriasis. No induration. No nail changes. Data:  Lab Results   Component Value Date    WBC 3.2 08/20/2021    RBC 4.28 08/20/2021    HGB 14.7 08/20/2021    HCT 43.1 08/20/2021     08/20/2021    .7 08/20/2021    MCH 34.3 08/20/2021    MCHC 34.0 08/20/2021    RDW 13.3 08/20/2021    SEGSPCT 67 06/21/2014    LYMPHOPCT 16.1 08/20/2021    MONOPCT 11.4 08/20/2021    BASOPCT 1.0 08/20/2021    MONOSABS 0.4 08/20/2021    LYMPHSABS 0.5 08/20/2021    EOSABS 0.2 08/20/2021    BASOSABS 0.0 08/20/2021       Chemistry        Component Value Date/Time     08/22/2018 0715    K 4.6 08/22/2018 0715     08/22/2018 0715    CO2 26 08/22/2018 0715    BUN 11 08/22/2018 0715    CREATININE 0.9 08/20/2021 0731        Component Value Date/Time    CALCIUM 9.0 08/22/2018 0715    ALKPHOS 133 (H) 08/22/2018 0715    AST 23 08/20/2021 0731    ALT 24 08/20/2021 0731    BILITOT 0.7 08/22/2018 0715          Lab Results   Component Value Date    SEDRATE 15 08/20/2021              I thank you for giving me the opportunity to be involved in [facility] care and I look forward following Chandan Yadav along with you. If you have any questions or concerns please feel free to contact me at any time. Total time 32 minutes that includes the following-  Preparing to see the patient such as reviewing patients records, pre-charting, preparing the visit on the same day, performing a medically appropriate history and physical examination, counseling and educating patient about diagnosis, management plan, ordering appropriate testings, prescriptions, communicating findings to other care providers, and documenting clinical information in electronic medical record.     Kamran Rice MD 11/22/21

## 2022-03-18 ENCOUNTER — OFFICE VISIT (OUTPATIENT)
Dept: RHEUMATOLOGY | Age: 72
End: 2022-03-18
Payer: COMMERCIAL

## 2022-03-18 VITALS
WEIGHT: 155 LBS | BODY MASS INDEX: 24.33 KG/M2 | HEIGHT: 67 IN | SYSTOLIC BLOOD PRESSURE: 134 MMHG | DIASTOLIC BLOOD PRESSURE: 82 MMHG

## 2022-03-18 DIAGNOSIS — Z79.899 HIGH RISK MEDICATION USE: ICD-10-CM

## 2022-03-18 DIAGNOSIS — M81.0 SENILE OSTEOPOROSIS: ICD-10-CM

## 2022-03-18 DIAGNOSIS — L40.50 PSORIATIC ARTHRITIS (HCC): Primary | ICD-10-CM

## 2022-03-18 DIAGNOSIS — L40.9 PSORIASIS: ICD-10-CM

## 2022-03-18 PROCEDURE — 99214 OFFICE O/P EST MOD 30 MIN: CPT | Performed by: INTERNAL MEDICINE

## 2022-03-18 NOTE — PROGRESS NOTES
[address] Caesar Araujo MD                                                                                                [phone] (I) [phone] (F)    Note is transcribed using voice recognition software. Inadvertent computerized transcription errors may be present. Primary provider: Jeferson Abad DO  Patient identification: Alejandro Burroughs,: 1950,Sex: male       ASSESSMENT:  Haris Pete was seen today for follow-up. Diagnoses and all orders for this visit:    Psoriatic arthritis ([address]    Psoriasis    High risk medication use    Senile osteoporosis  -     DEXA BONE DENSITY 2 SITES; Future    Other orders  -     methotrexate (RHEUMATREX) 2.5 MG chemo tablet; Take 8 tablets by mouth once a week       Assessment and plan as 3/18/2022    Psoriasis and psoriatic arthritis-asymptomatic methotrexate 20 mg weekly. Safety labs are normal-check from care everywhere [facility].  Prescription renewed. Psoriasis-previously had 6% body surface area, Dr. Jim Davis.  No skin lesions at this time. Osteoarthritis-asymptomatic findings in his finger joints, knees. Bone health-baseline DEXA scan screening. Previous medication-  Sulfasalazine-numbness and swelling around lips. .    Follow-up in 3 months. Patient indicates understanding and agrees with the management plan. ##################################################################    Subjective:  Returns for follow up  for psoriasis, psoriatic arthritis and osteoarthritis. Interval Helga Bruce is doing very well, denies any pain, swelling, stiffness in the joints. No skin psoriasis. Pleased on current regimen. Tolerating medications well. Normal ADLs and recreational activities. Rest of review of systems are negative. Past medical, surgical, family history- reviewed.     Current Outpatient Medications   Medication Sig Dispense Refill    methotrexate (RHEUMATREX) 2.5 MG chemo tablet Take 8 tablets by mouth once a week 96 tablet 0    clobetasol (TEMOVATE) 0.05 % ointment Apply to affected areas twice daily for up to 2 weeks or until improved. 60 g 2    methotrexate (RHEUMATREX) 2.5 MG chemo tablet Take 8 Tabs po once a week, same day every week,  split dose 6-8 hours apart on the same day. Safety alert: Labs as recommended by prescribing MD. 104 tablet 0    methotrexate (RHEUMATREX) 2.5 MG chemo tablet Take 8 Tabs po once a week, same day every week,  split dose 6-8 hours apart on the same day. 104 tablet 0    methotrexate (RHEUMATREX) 2.5 MG chemo tablet Take 8 Tabs po once a week, same day every week,  split dose 6-8 hours apart on the same day. 061 tablet 0    folic acid (FOLVITE) 1 MG tablet TAKE 1 TABLET BY MOUTH DAILY 90 tablet 0    doxazosin (CARDURA) 4 MG tablet TAKE 1 TABLET BY MOUTH DAILY 90 tablet 1    Multiple Vitamins-Minerals (MULTIVITAMIN PO) Take  by mouth.  aspirin EC 81 MG EC tablet Take 81 mg by mouth daily.  Cholecalciferol (VITAMIN D3) 2000 UNITS CAPS Take  by mouth. No current facility-administered medications for this visit. Allergies   Allergen Reactions    Sulfasalazine Swelling     Swelling and numbness around lips.  Lisinopril Swelling     No arbs also           OBJECTIVE  Physical    Vitals:    03/18/22 0812   BP: 134/82       General appearance: alert, appears stated age and cooperative. MKS:   28 joint JOINT COUNT:                               Right                   Left   Swell Tender Swell Tender   PIP1           PIP2           PIP3           PIP4          PIP5          MCP1           MCP2        MCP3       MCP4        MCP5           Wrist           Elbow           Shoulder          Knee             Other than asymptomatic OA changes across his finger joints, knees-normal musculoskeletal examination with full range of motion of peripheral joints.   Skin: no Psoriasis. No induration. No nail changes. Data:  Lab Results   Component Value Date    WBC 3.9 11/22/2021    RBC 4.05 11/22/2021    HGB 13.8 11/22/2021    HCT 42.0 11/22/2021     11/22/2021    .6 11/22/2021    MCH 34.2 11/22/2021    MCHC 33.0 11/22/2021    RDW 14.6 11/22/2021    SEGSPCT 67 06/21/2014    LYMPHOPCT 14.6 11/22/2021    MONOPCT 11.7 11/22/2021    BASOPCT 0.8 11/22/2021    MONOSABS 0.5 11/22/2021    LYMPHSABS 0.6 11/22/2021    EOSABS 0.2 11/22/2021    BASOSABS 0.0 11/22/2021       Chemistry        Component Value Date/Time     08/22/2018 0715    K 4.6 08/22/2018 0715     08/22/2018 0715    CO2 26 08/22/2018 0715    BUN 11 08/22/2018 0715    CREATININE 0.7 (L) 11/22/2021 0806        Component Value Date/Time    CALCIUM 9.0 08/22/2018 0715    ALKPHOS 133 (H) 08/22/2018 0715    AST 33 11/22/2021 0806    ALT 22 11/22/2021 0806    BILITOT 0.7 08/22/2018 0715          Lab Results   Component Value Date    SEDRATE 8 11/22/2021              I thank you for giving me the opportunity to be involved in [facility] and I look forward following León Hillman along with you. If you have any questions or concerns please feel free to contact me at any time. Total time 30 minutes that includes the following-  Preparing to see the patient such as reviewing patients records, pre-charting, preparing the visit on the same day, performing a medically appropriate history and physical examination, counseling and educating patient about diagnosis, management plan, ordering appropriate testings, prescriptions, communicating findings to other care providers, and documenting clinical information in electronic medical record.     Be Moran MD 03/18/22

## 2022-03-24 RX ORDER — CLOBETASOL PROPIONATE 0.5 MG/G
OINTMENT TOPICAL
Qty: 60 G | Refills: 2 | Status: SHIPPED | OUTPATIENT
Start: 2022-03-24 | End: 2022-08-31 | Stop reason: SDUPTHER

## 2022-05-09 ENCOUNTER — HOSPITAL ENCOUNTER (OUTPATIENT)
Dept: WOMENS IMAGING | Age: 72
Discharge: HOME | End: 2022-05-09
Payer: COMMERCIAL

## 2022-05-09 ENCOUNTER — OFFICE VISIT (OUTPATIENT)
Dept: DERMATOLOGY | Age: 72
End: 2022-05-09
Payer: COMMERCIAL

## 2022-05-09 VITALS — TEMPERATURE: 98.2 F

## 2022-05-09 DIAGNOSIS — M81.0 SENILE OSTEOPOROSIS: ICD-10-CM

## 2022-05-09 DIAGNOSIS — L40.0 PSORIASIS VULGARIS: Primary | ICD-10-CM

## 2022-05-09 DIAGNOSIS — D22.9 MULTIPLE NEVI: ICD-10-CM

## 2022-05-09 PROCEDURE — 99213 OFFICE O/P EST LOW 20 MIN: CPT | Performed by: DERMATOLOGY

## 2022-05-09 PROCEDURE — 77080 DXA BONE DENSITY AXIAL: CPT

## 2022-05-09 NOTE — PROGRESS NOTES
[address] [facility]  Gardenia Lancaster MD  [phone]      Claudia Burroughs  1950    67 y.o. male     Date of Visit: 5/9/2022    Chief Complaint: psoriasis    History of Present Illness:    1. He returns today to follow-up for history of moderately extensive plaque psoriasis with coexistent psoriatic arthritis. He remains on methotrexate 20 mg weekly for psoriatic arthritis which has benefited in psoriasis as well. He reports that his skin remains nearly clear. He has been using clobetasol ointment on a couple of areas on the right inner thigh and leg. 2.  He has multiple moles on the trunk and extremities-not aware of any concerning changes. Review of Systems:  Gen: Feels well, good sense of health. Past Medical History, Family History, Surgical History, Medications and Allergies reviewed. Past Medical History:   Diagnosis Date    History of SCC (squamous cell carcinoma) of skin 10/3/14    RT cheek    Hypertension      Past Surgical History:   Procedure Laterality Date    APPENDECTOMY  1996    COLONOSCOPY  2014    next 2017   [address] Left 09/2017    OTHER SURGICAL HISTORY Right     Squamous cell carcinoma of R cheek       Allergies   Allergen Reactions    Sulfasalazine Swelling     Swelling and numbness around lips.  Lisinopril Swelling     No arbs also       Outpatient Medications Marked as Taking for the 5/9/22 encounter (Office Visit) with Kenji Johnson MD   Medication Sig Dispense Refill    clobetasol (TEMOVATE) 0.05 % ointment Apply to affected areas twice daily for up to 2 weeks or until improved. 60 g 2    methotrexate (RHEUMATREX) 2.5 MG chemo tablet Take 8 tablets by mouth once a week 96 tablet 0    folic acid (FOLVITE) 1 MG tablet TAKE 1 TABLET BY MOUTH DAILY 90 tablet 0    doxazosin (CARDURA) 4 MG tablet TAKE 1 TABLET BY MOUTH DAILY 90 tablet 1    Multiple Vitamins-Minerals (MULTIVITAMIN PO) Take  by mouth.       aspirin EC 81 MG EC tablet Take 81 mg by mouth daily.  Cholecalciferol (VITAMIN D3) 2000 UNITS CAPS Take  by mouth. Social History:  Occupation:  Director at [employer]      Physical Examination       The following were examined and determined to be normal: Psych/Neuro, Scalp/hair, Head/face, Conjunctivae/eyelids, Gums/teeth/lips, Neck, Breast/axilla/chest, Abdomen, Back and RUE. The following were examined and determined to be abnormal: LUE, RLE and LLE. Well-appearing. 1.  Left elbow and both knees with very faint erythema. Right distal medial thigh and calf with hypopigmentation and few follicular papules. 2.  Trunk and extremities with several small round smooth brown macules. Assessment and Plan     1. Psoriasis vulgaris - nearly clear on methotrexate therapy for psoriatic arthritis; hypopigmentation and folliculitis due to clobetasol ointment use. Stop clobetasol ointment. Consider calcipotriene in the future if recurs. 2. Multiple nevi - benign appearing    Sun protective behaviors, including use of at least SPF 30 sunscreen, and self skin examinations were encouraged. Call for any new or concerning lesions. Return in about 1 year (around 5/9/2023).     --Alcon Crandall MD

## 2022-06-28 ENCOUNTER — OFFICE VISIT (OUTPATIENT)
Dept: RHEUMATOLOGY | Age: 72
End: 2022-06-28
Payer: COMMERCIAL

## 2022-06-28 VITALS
HEIGHT: 67 IN | WEIGHT: 155 LBS | SYSTOLIC BLOOD PRESSURE: 120 MMHG | DIASTOLIC BLOOD PRESSURE: 78 MMHG | BODY MASS INDEX: 24.33 KG/M2

## 2022-06-28 DIAGNOSIS — Z79.899 HIGH RISK MEDICATION USE: ICD-10-CM

## 2022-06-28 DIAGNOSIS — L40.9 PSORIASIS: ICD-10-CM

## 2022-06-28 DIAGNOSIS — M15.9 GENERALIZED OSTEOARTHRITIS: ICD-10-CM

## 2022-06-28 DIAGNOSIS — L40.50 PSORIATIC ARTHRITIS (HCC): Primary | ICD-10-CM

## 2022-06-28 PROCEDURE — 1123F ACP DISCUSS/DSCN MKR DOCD: CPT | Performed by: INTERNAL MEDICINE

## 2022-06-28 PROCEDURE — 99214 OFFICE O/P EST MOD 30 MIN: CPT | Performed by: INTERNAL MEDICINE

## 2022-06-28 NOTE — PROGRESS NOTES
[address] Caesar Araujo MD                                                                                                [phone] (S) [phone] (F)    Note is transcribed using voice recognition software. Inadvertent computerized transcription errors may be present. Primary provider: Jennifer Hutchison DO  Patient identification: Kendy Burroughs,: 1950,Sex: male       ASSESSMENT:  Jeremi Adame was seen today for follow-up. Diagnoses and all orders for this visit:    Psoriatic arthritis ([facility] [address]    Psoriasis    High risk medication use  -     Comprehensive Metabolic Panel; Future  -     CBC with Auto Differential; Future  -     C-Reactive Protein; Future  -     Sedimentation Rate; Future  -     Lipid, Fasting; Future    Generalized osteoarthritis    Other orders  -     methotrexate (RHEUMATREX) 2.5 MG chemo tablet; Take 8 Tabs po once a week, same day every week,  split dose 6-8 hours apart on the same day. Safety alert: Labs as recommended by prescribing MD.       Assessment and plan as 2022    Psoriasis and psoriatic arthritis-in remission methotrexate 20 mg weekly. Labs ordered. Prescription renewed. Psoriasis-previously had 6% body surface area, Dr. Christal Stephens.  No skin lesions at this time. Osteoarthritis-asymptomatic findings in his finger joints, knees. Very active physically. Bone health-normal DEXA scan 2022. Previous medication-  Sulfasalazine-numbness and swelling around lips. .    Follow-up in 3 months. Patient indicates understanding and agrees with the management plan. ##################################################################    Subjective:  Returns for follow up  for psoriasis, psoriatic arthritis and osteoarthritis. Interval changes-  He is doing very well. Denies any pain, swelling, stiffness in the joints.   Osteoarthritic changes in his finger joints unchanged. Pleased on current regimen. No flares. Tolerating medications well. Normal ADLs and recreational activities. Very active physically. Rest of review of systems are negative. Past medical, surgical, family history- reviewed. Current Outpatient Medications   Medication Sig Dispense Refill    methotrexate (RHEUMATREX) 2.5 MG chemo tablet Take 8 Tabs po once a week, same day every week,  split dose 6-8 hours apart on the same day. Safety alert: Labs as recommended by prescribing MD. 104 tablet 0    clobetasol (TEMOVATE) 0.05 % ointment Apply to affected areas twice daily for up to 2 weeks or until improved. 60 g 2    methotrexate (RHEUMATREX) 2.5 MG chemo tablet Take 8 tablets by mouth once a week 96 tablet 0    folic acid (FOLVITE) 1 MG tablet TAKE 1 TABLET BY MOUTH DAILY 90 tablet 0    doxazosin (CARDURA) 4 MG tablet TAKE 1 TABLET BY MOUTH DAILY 90 tablet 1    Multiple Vitamins-Minerals (MULTIVITAMIN PO) Take  by mouth.  aspirin EC 81 MG EC tablet Take 81 mg by mouth daily.  Cholecalciferol (VITAMIN D3) 2000 UNITS CAPS Take  by mouth. No current facility-administered medications for this visit. Allergies   Allergen Reactions    Sulfasalazine Swelling     Swelling and numbness around lips.  Lisinopril Swelling     No arbs also           OBJECTIVE  Physical    Vitals:    06/28/22 0712   BP: 120/78       General appearance: alert, appears stated age and cooperative. MKS:   28 joint JOINT COUNT:                               Right                   Left   Swell Tender Swell Tender   PIP1           PIP2           PIP3           PIP4          PIP5          MCP1           MCP2        MCP3       MCP4        MCP5           Wrist           Elbow           Shoulder          Knee             No appreciable tender, swollen, inflamed joints in upper or lower extremities. Asymptomatic OA changes across his finger joints, knees unchanged.   Skin: no

## 2022-07-02 ENCOUNTER — HOSPITAL ENCOUNTER (OUTPATIENT)
Age: 72
Discharge: HOME | End: 2022-07-02
Payer: COMMERCIAL

## 2022-07-02 DIAGNOSIS — Z79.899 HIGH RISK MEDICATION USE: ICD-10-CM

## 2022-07-02 LAB
A/G RATIO: 2.1 (ref 1.1–2.2)
ALBUMIN SERPL-MCNC: 4.4 G/DL (ref 3.4–5)
ALP BLD-CCNC: 131 U/L (ref 40–129)
ALT SERPL-CCNC: 22 U/L (ref 10–40)
ANION GAP SERPL CALCULATED.3IONS-SCNC: 14 MMOL/L (ref 3–16)
AST SERPL-CCNC: 24 U/L (ref 15–37)
BASOPHILS ABSOLUTE: 0 K/UL (ref 0–0.2)
BASOPHILS RELATIVE PERCENT: 0.7 %
BILIRUB SERPL-MCNC: 0.5 MG/DL (ref 0–1)
BUN BLDV-MCNC: 12 MG/DL (ref 7–20)
C-REACTIVE PROTEIN: <3 MG/L (ref 0–5.1)
CALCIUM SERPL-MCNC: 9.2 MG/DL (ref 8.3–10.6)
CHLORIDE BLD-SCNC: 106 MMOL/L (ref 99–110)
CHOLESTEROL, FASTING: 174 MG/DL (ref 0–199)
CO2: 23 MMOL/L (ref 21–32)
CREAT SERPL-MCNC: 0.8 MG/DL (ref 0.8–1.3)
EOSINOPHILS ABSOLUTE: 0.2 K/UL (ref 0–0.6)
EOSINOPHILS RELATIVE PERCENT: 5.5 %
GFR AFRICAN AMERICAN: >60
GFR NON-AFRICAN AMERICAN: >60
GLUCOSE BLD-MCNC: 90 MG/DL (ref 70–99)
HCT VFR BLD CALC: 41.6 % (ref 40.5–52.5)
HDLC SERPL-MCNC: 99 MG/DL (ref 40–60)
HEMOGLOBIN: 14.3 G/DL (ref 13.5–17.5)
LDL CHOLESTEROL CALCULATED: 65 MG/DL
LYMPHOCYTES ABSOLUTE: 0.6 K/UL (ref 1–5.1)
LYMPHOCYTES RELATIVE PERCENT: 14.6 %
MCH RBC QN AUTO: 35.9 PG (ref 26–34)
MCHC RBC AUTO-ENTMCNC: 34.3 G/DL (ref 31–36)
MCV RBC AUTO: 104.6 FL (ref 80–100)
MONOCYTES ABSOLUTE: 0.3 K/UL (ref 0–1.3)
MONOCYTES RELATIVE PERCENT: 7.6 %
NEUTROPHILS ABSOLUTE: 2.8 K/UL (ref 1.7–7.7)
NEUTROPHILS RELATIVE PERCENT: 71.6 %
PDW BLD-RTO: 13.6 % (ref 12.4–15.4)
PLATELET # BLD: 166 K/UL (ref 135–450)
PMV BLD AUTO: 8.9 FL (ref 5–10.5)
POTASSIUM SERPL-SCNC: 4.5 MMOL/L (ref 3.5–5.1)
RBC # BLD: 3.97 M/UL (ref 4.2–5.9)
SEDIMENTATION RATE, ERYTHROCYTE: 7 MM/HR (ref 0–20)
SODIUM BLD-SCNC: 143 MMOL/L (ref 136–145)
TOTAL PROTEIN: 6.5 G/DL (ref 6.4–8.2)
TRIGLYCERIDE, FASTING: 51 MG/DL (ref 0–150)
VLDLC SERPL CALC-MCNC: 10 MG/DL
WBC # BLD: 3.9 K/UL (ref 4–11)

## 2022-07-02 PROCEDURE — 80061 LIPID PANEL: CPT

## 2022-07-02 PROCEDURE — 85652 RBC SED RATE AUTOMATED: CPT

## 2022-07-02 PROCEDURE — 36415 COLL VENOUS BLD VENIPUNCTURE: CPT

## 2022-07-02 PROCEDURE — 85025 COMPLETE CBC W/AUTO DIFF WBC: CPT

## 2022-07-02 PROCEDURE — 80053 COMPREHEN METABOLIC PANEL: CPT

## 2022-07-02 PROCEDURE — 86140 C-REACTIVE PROTEIN: CPT

## 2022-08-31 ENCOUNTER — PATIENT MESSAGE (OUTPATIENT)
Dept: DERMATOLOGY | Age: 72
End: 2022-08-31

## 2022-08-31 RX ORDER — CLOBETASOL PROPIONATE 0.5 MG/G
OINTMENT TOPICAL
Qty: 60 G | Refills: 2 | Status: SHIPPED | OUTPATIENT
Start: 2022-08-31

## 2022-08-31 NOTE — TELEPHONE ENCOUNTER
From: Kathy Matute  To: Dr. Herlinda Hua  Sent: 8/31/2022 10:27 AM EDT  Subject: Gladys Romero,  It's about time for a refill on my Clobetasol ointment prescription. Pharmacy is the [facility] on [address] [place] [address] in [facility]. Please advise.   Thanks,  Sonia Head

## 2022-10-06 ENCOUNTER — HOSPITAL ENCOUNTER (OUTPATIENT)
Age: 72
Discharge: HOME | End: 2022-10-06
Payer: COMMERCIAL

## 2022-10-06 DIAGNOSIS — Z79.899 HIGH RISK MEDICATION USE: ICD-10-CM

## 2022-10-06 LAB
ALT SERPL-CCNC: 25 U/L (ref 10–40)
AST SERPL-CCNC: 24 U/L (ref 15–37)
BASOPHILS ABSOLUTE: 0 K/UL (ref 0–0.2)
BASOPHILS RELATIVE PERCENT: 0.7 %
C-REACTIVE PROTEIN: 7.8 MG/L (ref 0–5.1)
CREAT SERPL-MCNC: 0.7 MG/DL (ref 0.8–1.3)
EOSINOPHILS ABSOLUTE: 0.2 K/UL (ref 0–0.6)
EOSINOPHILS RELATIVE PERCENT: 6.4 %
GFR AFRICAN AMERICAN: >60
GFR NON-AFRICAN AMERICAN: >60
HCT VFR BLD CALC: 41.3 % (ref 40.5–52.5)
HEMOGLOBIN: 14 G/DL (ref 13.5–17.5)
LYMPHOCYTES ABSOLUTE: 0.7 K/UL (ref 1–5.1)
LYMPHOCYTES RELATIVE PERCENT: 20.5 %
MCH RBC QN AUTO: 34.8 PG (ref 26–34)
MCHC RBC AUTO-ENTMCNC: 34 G/DL (ref 31–36)
MCV RBC AUTO: 102.4 FL (ref 80–100)
MONOCYTES ABSOLUTE: 0.4 K/UL (ref 0–1.3)
MONOCYTES RELATIVE PERCENT: 11.8 %
NEUTROPHILS ABSOLUTE: 2.2 K/UL (ref 1.7–7.7)
NEUTROPHILS RELATIVE PERCENT: 60.6 %
PDW BLD-RTO: 14.2 % (ref 12.4–15.4)
PLATELET # BLD: 148 K/UL (ref 135–450)
PMV BLD AUTO: 9.2 FL (ref 5–10.5)
RBC # BLD: 4.03 M/UL (ref 4.2–5.9)
SEDIMENTATION RATE, ERYTHROCYTE: 10 MM/HR (ref 0–20)
WBC # BLD: 3.6 K/UL (ref 4–11)

## 2022-10-06 PROCEDURE — 84460 ALANINE AMINO (ALT) (SGPT): CPT

## 2022-10-06 PROCEDURE — 85652 RBC SED RATE AUTOMATED: CPT

## 2022-10-06 PROCEDURE — 86140 C-REACTIVE PROTEIN: CPT

## 2022-10-06 PROCEDURE — 84450 TRANSFERASE (AST) (SGOT): CPT

## 2022-10-06 PROCEDURE — 36415 COLL VENOUS BLD VENIPUNCTURE: CPT

## 2022-10-06 PROCEDURE — 82565 ASSAY OF CREATININE: CPT

## 2022-10-06 PROCEDURE — 85025 COMPLETE CBC W/AUTO DIFF WBC: CPT

## 2022-10-08 NOTE — PROGRESS NOTES
[address] Caesar Araujo MD                                                                                                [phone] (H) [phone] (F)    Note is transcribed using voice recognition software. Inadvertent computerized transcription errors may be present. Primary provider: Taqueria Ferreira DO  Patient identification: Gary Burroughs,: 1950,Sex: male       ASSESSMENT:  Joss Vega was seen today for follow-up. Diagnoses and all orders for this visit:    Psoriatic arthritis ([address]    Psoriasis    Generalized osteoarthritis    High risk medication use    Other orders  -     methotrexate (RHEUMATREX) 2.5 MG chemo tablet; Take 8 tablets by mouth once a week     Assessment and plan as 10/10/2022    Psoriasis and psoriatic arthritis-in remission methotrexate 20 mg weekly. Since he is symptom-free for more than couple of years, we discussed about reducing the dose of methotrexate to 17.5 mg weekly. He also has mild leukopenia, likely related to methotrexate. Prescription renewed. Advised to resume the previous dose if he notices joints or skin flares. 2. Psoriasis-previously had 6% body surface area, Dr. Jac Patel.  No skin lesions at this time. 3. Osteoarthritis-asymptomatic findings in his finger joints, knees. Very active physically. Bone health-normal DEXA scan 2022. Previous medication-  Sulfasalazine-numbness and swelling around lips. .    Follow-up in 3 months. Patient indicates understanding and agrees with the management plan. ##################################################################    Subjective:  Returns for follow up  for psoriasis, psoriatic arthritis and osteoarthritis. Interval changes-  He remains completely asymptomatic in terms of psoriasis and psoriatic arthritis on current dose of methotrexate.   Mild intercurrent arthralgias from osteoarthritis, does not affect ADLs and recreational activities. He is very active physically. Tolerating medications well. Rest of review of systems are negative. Past medical, surgical, family history- reviewed. Current Outpatient Medications   Medication Sig Dispense Refill    methotrexate (RHEUMATREX) 2.5 MG chemo tablet Take 8 tablets by mouth once a week 96 tablet 0    clobetasol (TEMOVATE) 0.05 % ointment Apply to affected areas twice daily for up to 2 weeks or until improved. 60 g 2    methotrexate (RHEUMATREX) 2.5 MG chemo tablet Take 8 Tabs po once a week, same day every week,  split dose 6-8 hours apart on the same day. Safety alert: Labs as recommended by prescribing MD. 523 tablet 0    folic acid (FOLVITE) 1 MG tablet TAKE 1 TABLET BY MOUTH DAILY 90 tablet 0    doxazosin (CARDURA) 4 MG tablet TAKE 1 TABLET BY MOUTH DAILY 90 tablet 1    Multiple Vitamins-Minerals (MULTIVITAMIN PO) Take  by mouth. aspirin EC 81 MG EC tablet Take 81 mg by mouth daily. Cholecalciferol (VITAMIN D3) 2000 UNITS CAPS Take  by mouth. No current facility-administered medications for this visit. Allergies   Allergen Reactions    Sulfasalazine Swelling     Swelling and numbness around lips. Lisinopril Swelling     No arbs also           OBJECTIVE  Physical    Vitals:    10/10/22 0743   BP: 120/76         General appearance: alert, appears stated age and cooperative. MKS:   28 joint JOINT COUNT:                               Right                   Left   Swell Tender Swell Tender   PIP1           PIP2           PIP3           PIP4          PIP5          MCP1           MCP2        MCP3       MCP4        MCP5           Wrist           Elbow           Shoulder          Knee             Other than asymptomatic OA changes in his finger joints, knees-no appreciable tender, swollen, inflamed joints in upper or lower extremities. Skin: no Psoriasis. No induration.  No nail changes. Data:  Lab Results   Component Value Date/Time    WBC 3.6 10/06/2022 07:17 AM    RBC 4.03 10/06/2022 07:17 AM    HGB 14.0 10/06/2022 07:17 AM    HCT 41.3 10/06/2022 07:17 AM     10/06/2022 07:17 AM    .4 10/06/2022 07:17 AM    MCH 34.8 10/06/2022 07:17 AM    MCHC 34.0 10/06/2022 07:17 AM    RDW 14.2 10/06/2022 07:17 AM    SEGSPCT 67 06/21/2014 08:11 AM    LYMPHOPCT 20.5 10/06/2022 07:17 AM    MONOPCT 11.8 10/06/2022 07:17 AM    BASOPCT 0.7 10/06/2022 07:17 AM    MONOSABS 0.4 10/06/2022 07:17 AM    LYMPHSABS 0.7 10/06/2022 07:17 AM    EOSABS 0.2 10/06/2022 07:17 AM    BASOSABS 0.0 10/06/2022 07:17 AM       Chemistry        Component Value Date/Time     07/02/2022 0824    K 4.5 07/02/2022 0824     07/02/2022 0824    CO2 23 07/02/2022 0824    BUN 12 07/02/2022 0824    CREATININE 0.7 (L) 10/06/2022 0717        Component Value Date/Time    CALCIUM 9.2 07/02/2022 0824    ALKPHOS 131 (H) 07/02/2022 0824    AST 24 10/06/2022 0717    ALT 25 10/06/2022 0717    BILITOT 0.5 07/02/2022 0824          Lab Results   Component Value Date    SEDRATE 10 10/06/2022       Total time 30 minutes- reviewing medical records & pre charting on the same day of visit,  history taking, exam, explaining medical diagnosis, management plan, ordering labs, filling medications, communicating findings with other providers and medical documentation in EHR.      Ly So MD 10/10/22

## 2022-10-10 ENCOUNTER — OFFICE VISIT (OUTPATIENT)
Dept: RHEUMATOLOGY | Age: 72
End: 2022-10-10
Payer: COMMERCIAL

## 2022-10-10 VITALS
SYSTOLIC BLOOD PRESSURE: 120 MMHG | DIASTOLIC BLOOD PRESSURE: 76 MMHG | BODY MASS INDEX: 24.33 KG/M2 | HEIGHT: 67 IN | WEIGHT: 155 LBS

## 2022-10-10 DIAGNOSIS — M15.9 GENERALIZED OSTEOARTHRITIS: ICD-10-CM

## 2022-10-10 DIAGNOSIS — Z79.899 HIGH RISK MEDICATION USE: ICD-10-CM

## 2022-10-10 DIAGNOSIS — L40.9 PSORIASIS: ICD-10-CM

## 2022-10-10 DIAGNOSIS — L40.50 PSORIATIC ARTHRITIS (HCC): Primary | ICD-10-CM

## 2022-10-10 PROCEDURE — 1123F ACP DISCUSS/DSCN MKR DOCD: CPT | Performed by: INTERNAL MEDICINE

## 2022-10-10 PROCEDURE — 99214 OFFICE O/P EST MOD 30 MIN: CPT | Performed by: INTERNAL MEDICINE

## 2022-11-17 RX ORDER — CLOBETASOL PROPIONATE 0.5 MG/G
OINTMENT TOPICAL
Qty: 60 G | Refills: 2 | Status: SHIPPED | OUTPATIENT
Start: 2022-11-17

## 2023-01-19 ENCOUNTER — PATIENT MESSAGE (OUTPATIENT)
Dept: DERMATOLOGY | Age: 73
End: 2023-01-19

## 2023-01-19 RX ORDER — CLOBETASOL PROPIONATE 0.5 MG/G
OINTMENT TOPICAL
Qty: 60 G | Refills: 2 | Status: SHIPPED | OUTPATIENT
Start: 2023-01-19

## 2023-01-19 NOTE — TELEPHONE ENCOUNTER
From: Soledad Khan  To: Dr. Robert Dates  Sent: 1/19/2023 11:50 AM EST  Subject: Prescription refill    Thank you for processing this request.  Have a great day!

## 2023-02-21 ENCOUNTER — HOSPITAL ENCOUNTER (OUTPATIENT)
Age: 73
Discharge: HOME | End: 2023-02-21
Payer: COMMERCIAL

## 2023-02-21 LAB
ALT SERPL-CCNC: 21 U/L (ref 10–40)
AST SERPL-CCNC: 30 U/L (ref 15–37)
C-REACTIVE PROTEIN: <3 MG/L (ref 0–5.1)
CREAT SERPL-MCNC: 0.7 MG/DL (ref 0.8–1.3)
GFR SERPL CREATININE-BSD FRML MDRD: >60 ML/MIN/{1.73_M2}
SEDIMENTATION RATE, ERYTHROCYTE: 15 MM/HR (ref 0–20)

## 2023-02-21 PROCEDURE — 82565 ASSAY OF CREATININE: CPT

## 2023-02-21 PROCEDURE — 36415 COLL VENOUS BLD VENIPUNCTURE: CPT

## 2023-02-21 PROCEDURE — 84450 TRANSFERASE (AST) (SGOT): CPT

## 2023-02-21 PROCEDURE — 84460 ALANINE AMINO (ALT) (SGPT): CPT

## 2023-02-21 PROCEDURE — 86140 C-REACTIVE PROTEIN: CPT

## 2023-02-21 PROCEDURE — 85652 RBC SED RATE AUTOMATED: CPT

## 2023-02-23 ENCOUNTER — OFFICE VISIT (OUTPATIENT)
Dept: RHEUMATOLOGY | Age: 73
End: 2023-02-23
Payer: COMMERCIAL

## 2023-02-23 VITALS
SYSTOLIC BLOOD PRESSURE: 120 MMHG | HEIGHT: 67 IN | BODY MASS INDEX: 24.33 KG/M2 | WEIGHT: 155 LBS | DIASTOLIC BLOOD PRESSURE: 78 MMHG

## 2023-02-23 DIAGNOSIS — L40.9 PSORIASIS: ICD-10-CM

## 2023-02-23 DIAGNOSIS — Z79.899 HIGH RISK MEDICATION USE: ICD-10-CM

## 2023-02-23 DIAGNOSIS — L40.50 PSORIATIC ARTHRITIS (HCC): Primary | ICD-10-CM

## 2023-02-23 PROCEDURE — 1123F ACP DISCUSS/DSCN MKR DOCD: CPT | Performed by: INTERNAL MEDICINE

## 2023-02-23 PROCEDURE — 99214 OFFICE O/P EST MOD 30 MIN: CPT | Performed by: INTERNAL MEDICINE

## 2023-02-23 PROCEDURE — 3078F DIAST BP <80 MM HG: CPT | Performed by: INTERNAL MEDICINE

## 2023-02-23 PROCEDURE — 3074F SYST BP LT 130 MM HG: CPT | Performed by: INTERNAL MEDICINE

## 2023-02-23 RX ORDER — FOLIC ACID 1 MG/1
TABLET ORAL
Qty: 90 TABLET | Refills: 0 | Status: SHIPPED | OUTPATIENT
Start: 2023-02-23

## 2023-02-23 NOTE — PROGRESS NOTES
[address] Araujo MD                                                                                                [phone] (I) [phone] (F)    Note is transcribed using voice recognition software. Inadvertent computerized transcription errors may be present. Primary provider: Juan Witt DO  Patient identification: Deborah Burroughs,: 1950,Sex: male       ASSESSMENT:  Oumar Patel was seen today for follow-up. Diagnoses and all orders for this visit:    Psoriatic arthritis ([address]    Psoriasis    High risk medication use    Other orders  -     methotrexate (RHEUMATREX) 2.5 MG chemo tablet; Take 8 Tabs po once a week, same day every week,  split dose 6-8 hours apart on the same day. Safety alert: Labs as recommended by prescribing MD.  -     folic acid (FOLVITE) 1 MG tablet; TAKE 1 TABLET BY MOUTH DAILY     Assessment and plan as 2023    Psoriasis and psoriatic arthritis-asymptomatic, continue current regimen-methotrexate 20 mg weekly. Labs are normal.  Prescription renewed. 2. Psoriasis-previously had 6% body surface area, Dr. Toby Hogan.  No skin lesions at this time. 3. Osteoarthritis-asymptomatic findings in his finger joints, knees. Very active physically. Bone health-normal DEXA scan 2022. Previous medication-  Sulfasalazine-numbness and swelling around lips. .    Follow-up in 3 months. Patient indicates understanding and agrees with the management plan. ##################################################################    Subjective:  Returns for follow up  for psoriasis, psoriatic arthritis and osteoarthritis. Interval changes-  He is doing very well, no concerns or complaints. He remains completely asymptomatic in terms of psoriasis and psoriatic arthritis on current dose of methotrexate.   Mild intercurrent arthralgias from osteoarthritis, does not affect ADLs and recreational activities. He is very active physically. Tolerating medications well. Rest of review of systems are negative. Past medical, surgical, family history- reviewed. Current Outpatient Medications   Medication Sig Dispense Refill    methotrexate (RHEUMATREX) 2.5 MG chemo tablet Take 8 Tabs po once a week, same day every week,  split dose 6-8 hours apart on the same day. Safety alert: Labs as recommended by prescribing MD. 428 tablet 0    folic acid (FOLVITE) 1 MG tablet TAKE 1 TABLET BY MOUTH DAILY 90 tablet 0    clobetasol (TEMOVATE) 0.05 % ointment Apply to affected areas twice daily for up to 2 weeks or until improved. 60 g 2    doxazosin (CARDURA) 4 MG tablet TAKE 1 TABLET BY MOUTH DAILY 90 tablet 1    Multiple Vitamins-Minerals (MULTIVITAMIN PO) Take  by mouth. aspirin EC 81 MG EC tablet Take 81 mg by mouth daily. Cholecalciferol (VITAMIN D3) 2000 UNITS CAPS Take  by mouth. No current facility-administered medications for this visit. Allergies   Allergen Reactions    Sulfasalazine Swelling     Swelling and numbness around lips. Lisinopril Swelling     No arbs also           OBJECTIVE  Physical    Vitals:    02/23/23 0725   BP: 120/78         General appearance: alert, appears stated age and cooperative. MKS:   28 joint JOINT COUNT:                               Right                   Left   Swell Tender Swell Tender   PIP1           PIP2           PIP3           PIP4          PIP5          MCP1           MCP2        MCP3       MCP4        MCP5           Wrist           Elbow           Shoulder          Knee           No inflammatory joint findings in exam.  Other than asymptomatic OA changes in his finger joints, knees-no appreciable tender, swollen, inflamed joints in upper or lower extremities. Skin: no Psoriasis. No induration. No nail changes.     Data:  Lab Results   Component Value Date/Time    WBC 3.6 10/06/2022 07:17 AM    RBC 4.03 10/06/2022 07:17 AM    HGB 14.0 10/06/2022 07:17 AM    HCT 41.3 10/06/2022 07:17 AM     10/06/2022 07:17 AM    .4 10/06/2022 07:17 AM    MCH 34.8 10/06/2022 07:17 AM    MCHC 34.0 10/06/2022 07:17 AM    RDW 14.2 10/06/2022 07:17 AM    SEGSPCT 67 06/21/2014 08:11 AM    LYMPHOPCT 20.5 10/06/2022 07:17 AM    MONOPCT 11.8 10/06/2022 07:17 AM    BASOPCT 0.7 10/06/2022 07:17 AM    MONOSABS 0.4 10/06/2022 07:17 AM    LYMPHSABS 0.7 10/06/2022 07:17 AM    EOSABS 0.2 10/06/2022 07:17 AM    BASOSABS 0.0 10/06/2022 07:17 AM       Chemistry        Component Value Date/Time     07/02/2022 0824    K 4.5 07/02/2022 0824     07/02/2022 0824    CO2 23 07/02/2022 0824    BUN 12 07/02/2022 0824    CREATININE 0.7 (L) 02/21/2023 0721        Component Value Date/Time    CALCIUM 9.2 07/02/2022 0824    ALKPHOS 131 (H) 07/02/2022 0824    AST 30 02/21/2023 0721    ALT 21 02/21/2023 0721    BILITOT 0.5 07/02/2022 0824          Lab Results   Component Value Date    SEDRATE 15 02/21/2023       Total time 30 minutes- reviewing medical records & pre charting on the same day of visit,  history taking, exam, explaining medical diagnosis, management plan, ordering labs, filling medications, communicating findings with other providers and medical documentation in EHR.      Julio César Matthews MD 02/23/23

## 2023-04-07 RX ORDER — CLOBETASOL PROPIONATE 0.5 MG/G
OINTMENT TOPICAL
Qty: 60 G | Refills: 0 | Status: SHIPPED | OUTPATIENT
Start: 2023-04-07

## 2023-05-08 ENCOUNTER — OFFICE VISIT (OUTPATIENT)
Dept: DERMATOLOGY | Age: 73
End: 2023-05-08
Payer: COMMERCIAL

## 2023-05-08 DIAGNOSIS — L40.0 PSORIASIS VULGARIS: Primary | ICD-10-CM

## 2023-05-08 DIAGNOSIS — D22.9 MULTIPLE NEVI: ICD-10-CM

## 2023-05-08 PROCEDURE — 1123F ACP DISCUSS/DSCN MKR DOCD: CPT | Performed by: DERMATOLOGY

## 2023-05-08 PROCEDURE — 99214 OFFICE O/P EST MOD 30 MIN: CPT | Performed by: DERMATOLOGY

## 2023-05-08 RX ORDER — CALCIPOTRIENE 50 UG/G
CREAM TOPICAL
Qty: 60 G | Refills: 4 | Status: SHIPPED | OUTPATIENT
Start: 2023-05-08

## 2023-05-08 NOTE — PROGRESS NOTES
[facility]  Liv Triana MD  [phone]      Jose Alberto Burroughs  1950    68 y.o. male     Date of Visit: 5/8/2023    Chief Complaint: psoriasis    History of Present Illness:    1. He returns today to follow-up for moderately extensive plaque psoriasis. He remains markedly improved on methotrexate 20 mg weekly, prescribed by his rheumatologist for psoriatic arthritis. He reports a couple of residual lesions on the right thigh and back. He uses clobetasol ointment regularly on the right thigh. 2.  He also presents today for evaluation of multiple moles - not aware of any changes in size, color or shape. Review of Systems:  Gen: Feels well, good sense of health. Past Medical History, Family History, Surgical History, Medications and Allergies reviewed. Past Medical History:   Diagnosis Date    History of SCC (squamous cell carcinoma) of skin 10/3/14    RT cheek    Hypertension      Past Surgical History:   Procedure Laterality Date    APPENDECTOMY  1996    COLONOSCOPY  2014    next 2017    [address] Left 09/2017    OTHER SURGICAL HISTORY Right     Squamous cell carcinoma of R cheek       Allergies   Allergen Reactions    Sulfasalazine Swelling     Swelling and numbness around lips. Lisinopril Swelling     No arbs also       Outpatient Medications Marked as Taking for the 5/8/23 encounter (Office Visit) with Agusto Barth MD   Medication Sig Dispense Refill    calcipotriene (DOVONEX) 0.005 % cream Apply to affected areas on the back and right thigh twice daily for psoriasis. 60 g 4    clobetasol (TEMOVATE) 0.05 % ointment Apply to affected areas twice daily for up to 2 weeks or until improved. 60 g 0    methotrexate (RHEUMATREX) 2.5 MG chemo tablet Take 8 Tabs po once a week, same day every week,  split dose 6-8 hours apart on the same day.  Safety alert: Labs as recommended by prescribing MD. 603 tablet 0    folic acid (FOLVITE) 1 MG

## 2023-08-31 ENCOUNTER — PATIENT MESSAGE (OUTPATIENT)
Dept: DERMATOLOGY | Age: 73
End: 2023-08-31

## 2023-08-31 RX ORDER — CALCIPOTRIENE 50 UG/G
CREAM TOPICAL
Qty: 60 G | Refills: 4 | Status: SHIPPED | OUTPATIENT
Start: 2023-08-31

## 2023-09-01 NOTE — TELEPHONE ENCOUNTER
From: Markie Barba  To: Dr. Brook Franco  Sent: 8/31/2023 8:32 PM EDT  Subject: Prescription refill    Thank you and have a great holiday weekend!

## 2024-02-22 RX ORDER — CALCIPOTRIENE 50 UG/G
CREAM TOPICAL
Qty: 60 G | Refills: 4 | Status: SHIPPED | OUTPATIENT
Start: 2024-02-22

## 2024-10-30 ENCOUNTER — OFFICE VISIT (OUTPATIENT)
Dept: DERMATOLOGY | Age: 74
End: 2024-10-30
Payer: COMMERCIAL

## 2024-10-30 DIAGNOSIS — D22.9 MULTIPLE NEVI: ICD-10-CM

## 2024-10-30 DIAGNOSIS — L40.0 PSORIASIS VULGARIS: Primary | ICD-10-CM

## 2024-10-30 PROCEDURE — 99213 OFFICE O/P EST LOW 20 MIN: CPT | Performed by: DERMATOLOGY

## 2024-10-30 PROCEDURE — 1123F ACP DISCUSS/DSCN MKR DOCD: CPT | Performed by: DERMATOLOGY

## 2024-10-30 NOTE — PROGRESS NOTES
[facility]  Thor Rosado MD  [phone]      Ayan Burrougsh  1950    74 y.o. male     Date of Visit: 10/30/2024    Chief Complaint: psoriasis    History of Present Illness:    1.  He has a history of moderately extensive plaque psoriasis in the setting of psoriatic arthritis.  He takes methotrexate per his rheumatologist.    Has clobetasol ointment and Dovonex cream if needed - has not needed in few months.     2.  He also presents today for evaluation of multiple moles - not aware of any changes in size, color or shape.         Review of Systems:  Gen: Feels well, good sense of health.    Past Medical History, Family History, Surgical History, Medications and Allergies reviewed.    Past Medical History:   Diagnosis Date    History of SCC (squamous cell carcinoma) of skin 10/3/14    RT cheek    Hypertension      Past Surgical History:   Procedure Laterality Date    APPENDECTOMY  1996    COLONOSCOPY  2014    next 2017    HERNIA REPAIR      HERNIA REPAIR Left 09/2017    OTHER SURGICAL HISTORY Right     Squamous cell carcinoma of R cheek       Allergies   Allergen Reactions    Sulfasalazine Swelling     Swelling and numbness around lips.    Lisinopril Swelling     No arbs also       Outpatient Medications Marked as Taking for the 10/30/24 encounter (Office Visit) with Thor Rosado MD   Medication Sig Dispense Refill    calcipotriene (DOVONEX) 0.005 % cream Apply to affected areas on the back and right thigh twice daily for psoriasis. 60 g 4    clobetasol (TEMOVATE) 0.05 % ointment Apply to affected areas twice daily for up to 2 weeks or until improved. 60 g 0    methotrexate (RHEUMATREX) 2.5 MG chemo tablet Take 8 Tabs po once a week, same day every week,  split dose 6-8 hours apart on the same day. Safety alert: Labs as recommended by prescribing MD. 104 tablet 0    folic acid (FOLVITE) 1 MG tablet TAKE 1 TABLET BY MOUTH DAILY 90 tablet 0    doxazosin (CARDURA) 4 MG tablet

## 2025-03-31 RX ORDER — CALCIPOTRIENE 50 UG/G
CREAM TOPICAL
Qty: 60 G | Refills: 4 | Status: SHIPPED | OUTPATIENT
Start: 2025-03-31

## 2025-04-01 ENCOUNTER — PATIENT MESSAGE (OUTPATIENT)
Age: 75
End: 2025-04-01

## 2025-04-01 ENCOUNTER — TELEPHONE (OUTPATIENT)
Age: 75
End: 2025-04-01

## 2025-04-01 NOTE — TELEPHONE ENCOUNTER
Approved today by [employer] NCPDP 2017  Your request has been approved  CALCIPOTRIENE Cream Type of coverage approved: Prior Authorization This approval authorizes your coverage from 03/01/2025 - 12/31/2025  Effective Date: 3/1/2025  Authorization Expiration Date: 12/31/2025    If this requires a response please respond to the pool ( P MHCX PSC MEDICATION PRE-AUTH).      Thank you please advise patient.

## 2025-04-01 NOTE — TELEPHONE ENCOUNTER
Submitted PA for Calcipotriene  [facility]: [id number] STATUS: PENDING.    Follow up done daily; if no decision with in three days we will refax.  If another three days goes by with no decision will call the insurance for status.